# Patient Record
Sex: MALE | NOT HISPANIC OR LATINO | Employment: FULL TIME | URBAN - METROPOLITAN AREA
[De-identification: names, ages, dates, MRNs, and addresses within clinical notes are randomized per-mention and may not be internally consistent; named-entity substitution may affect disease eponyms.]

---

## 2018-02-23 ENCOUNTER — OFFICE VISIT CONVERTED (OUTPATIENT)
Dept: NEUROSURGERY | Facility: CLINIC | Age: 48
End: 2018-02-23
Attending: PHYSICIAN ASSISTANT

## 2021-05-16 VITALS
BODY MASS INDEX: 26.36 KG/M2 | SYSTOLIC BLOOD PRESSURE: 156 MMHG | HEART RATE: 103 BPM | WEIGHT: 178 LBS | DIASTOLIC BLOOD PRESSURE: 87 MMHG | HEIGHT: 69 IN

## 2022-12-20 ENCOUNTER — OFFICE VISIT (OUTPATIENT)
Dept: ORTHOPEDIC SURGERY | Facility: CLINIC | Age: 52
End: 2022-12-20

## 2022-12-20 VITALS — WEIGHT: 178 LBS | BODY MASS INDEX: 26.36 KG/M2 | HEIGHT: 69 IN

## 2022-12-20 DIAGNOSIS — M16.11 OSTEOARTHRITIS OF RIGHT HIP, UNSPECIFIED OSTEOARTHRITIS TYPE: ICD-10-CM

## 2022-12-20 DIAGNOSIS — M25.551 RIGHT HIP PAIN: Primary | ICD-10-CM

## 2022-12-20 PROCEDURE — 99203 OFFICE O/P NEW LOW 30 MIN: CPT | Performed by: ORTHOPAEDIC SURGERY

## 2022-12-20 RX ORDER — BUSPIRONE HYDROCHLORIDE 15 MG/1
15 TABLET ORAL 3 TIMES DAILY
COMMUNITY
Start: 2022-11-22

## 2022-12-20 RX ORDER — DULOXETIN HYDROCHLORIDE 60 MG/1
60 CAPSULE, DELAYED RELEASE ORAL DAILY
COMMUNITY
Start: 2022-11-22

## 2022-12-20 RX ORDER — MELOXICAM 15 MG/1
TABLET ORAL
COMMUNITY

## 2022-12-20 RX ORDER — GABAPENTIN 400 MG/1
CAPSULE ORAL
COMMUNITY

## 2022-12-20 RX ORDER — ATORVASTATIN CALCIUM 40 MG/1
TABLET, FILM COATED ORAL
COMMUNITY

## 2022-12-20 RX ORDER — TAMSULOSIN HYDROCHLORIDE 0.4 MG/1
CAPSULE ORAL
COMMUNITY

## 2022-12-20 RX ORDER — HYDROCODONE BITARTRATE AND ACETAMINOPHEN 7.5; 325 MG/1; MG/1
TABLET ORAL
COMMUNITY
Start: 2022-12-02

## 2022-12-20 NOTE — PROGRESS NOTES
"Chief Complaint  Initial Evaluation of the Right Hip     Subjective      Torri Mendoza presents to [facility] for initial evaluation of the right hip.  He has had pain for the last 8 years or so.  He had a right steroid injection of the hip a month ago.  He reports it just has gotten worse over time. He has difficulty with prolonged standing and ambulation.  He has not had much conservative treatment besides injections. He has had no new injury or fall.  He carries a lot of heavy items and up and down the ladder a lot at his job.     No Known Allergies     Social History     Socioeconomic History   • Marital status:    Tobacco Use   • Smoking status: Every Day     Types: Cigarettes        Review of Systems     Objective   Vital Signs:   Ht 175.3 cm (69\")   Wt 80.7 kg (178 lb)   BMI 26.29 kg/m²       Physical Exam  Constitutional:       Appearance: Normal appearance. Patient is well-developed and normal weight.   HENT:      Head: Normocephalic.      Right Ear: Hearing and external ear normal.      Left Ear: Hearing and external ear normal.      Nose: Nose normal.   Eyes:      Conjunctiva/sclera: Conjunctivae normal.   Cardiovascular:      Rate and Rhythm: Normal rate.   Pulmonary:      Effort: Pulmonary effort is normal.      Breath sounds: No wheezing or rales.   Abdominal:      Palpations: Abdomen is soft.      Tenderness: There is no abdominal tenderness.   Musculoskeletal:      Cervical back: Normal range of motion.   Skin:     Findings: No rash.   Neurological:      Mental Status: Patient is alert and oriented to person, place, and time.   Psychiatric:         Mood and Affect: Mood and affect normal.         Judgment: Judgment normal.       Ortho Exam      RIGHT HIP Dorsal pedal pulse 2+. Posterior tibialis pulse is 2+. Good strength to hamstrings, quadriceps, dorsiflexors, and plantar flexors. Sensation intact. Neurovascular Intact. No swelling. No skin discoloration or " muscle atrophy.       Procedures      Imaging Results (Most Recent)     None           Result Review :     X-Ray Report:  Right hip X-Ray  Indication: Evaluation of the right hip.   AP/Lateral view(s)  Findings: Severe arthritis.  Moderate - severe osseous abnormality, no dislocation or fracture..   Prior studies available for comparison:No            Assessment and Plan     Diagnoses and all orders for this visit:    1. Right hip pain (Primary)  -     XR Hip With or Without Pelvis 2 - 3 View Right; Future    2. Osteoarthritis of right hip, unspecified osteoarthritis type        Discussed the treatment plan with the patient. Discussed conservative measures as exercises, anti-inflammatory and injection. Discussed the treatment options with the patient, operative vs non-operative. He will need a total hip arthroplasty when he is ready. He is on Mobic if needed and pain medication. Discussed activity modification to prevent further injury and decrease pain.     Educated on risk of smoking. Discussed options for smoking cessation. and Call or return if worsening symptoms.    Follow Up     PRN    Patient was given instructions and counseling regarding his condition or for health maintenance advice. Please see specific information pulled into the AVS if appropriate.     Scribed for Dyan Ag MD by Clara Cleary MA.  12/20/22   15:48 EST    I have personally performed the services described in this document as scribed by the above individual and it is both accurate and complete. Dyan Ag MD 12/20/22

## 2024-12-13 ENCOUNTER — PREP FOR SURGERY (OUTPATIENT)
Dept: OTHER | Facility: HOSPITAL | Age: 54
End: 2024-12-13
Payer: COMMERCIAL

## 2024-12-13 ENCOUNTER — OFFICE VISIT (OUTPATIENT)
Dept: ORTHOPEDIC SURGERY | Facility: CLINIC | Age: 54
End: 2024-12-13
Payer: COMMERCIAL

## 2024-12-13 VITALS
BODY MASS INDEX: 26.29 KG/M2 | HEIGHT: 69 IN | OXYGEN SATURATION: 96 % | HEART RATE: 75 BPM | DIASTOLIC BLOOD PRESSURE: 90 MMHG | SYSTOLIC BLOOD PRESSURE: 163 MMHG

## 2024-12-13 DIAGNOSIS — M16.11 OSTEOARTHRITIS OF RIGHT HIP, UNSPECIFIED OSTEOARTHRITIS TYPE: Primary | ICD-10-CM

## 2024-12-13 DIAGNOSIS — M25.551 RIGHT HIP PAIN: Primary | ICD-10-CM

## 2024-12-13 DIAGNOSIS — M16.11 OSTEOARTHRITIS OF RIGHT HIP, UNSPECIFIED OSTEOARTHRITIS TYPE: ICD-10-CM

## 2024-12-13 PROBLEM — M16.9 OA (OSTEOARTHRITIS) OF HIP: Status: ACTIVE | Noted: 2024-12-13

## 2024-12-13 RX ORDER — TRANEXAMIC ACID 10 MG/ML
1000 INJECTION, SOLUTION INTRAVENOUS ONCE
OUTPATIENT
Start: 2024-12-13 | End: 2024-12-13

## 2024-12-13 RX ORDER — DICLOFENAC SODIUM 75 MG/1
75 TABLET, DELAYED RELEASE ORAL 2 TIMES DAILY
Qty: 60 TABLET | Refills: 1 | Status: SHIPPED | OUTPATIENT
Start: 2024-12-13

## 2024-12-13 NOTE — PROGRESS NOTES
"Chief Complaint  Follow-up of the Right Hip     Subjective      Torri Mendoza presents to [facility] for follow up of the right hip. He has had pain for the last 8 years or so. He had a right steroid injection of the hip a month ago. He reports it just has gotten worse over time. He has difficulty with prolonged standing and ambulation.  He has had no new injury or fall. He carries a lot of heavy items and up and down the ladder a lot at his job. He notes the pain is affecting his daily tasks and ADL's.  He can barely do his job at this time and has a significant painful and altered gait.     No Known Allergies     Social History     Socioeconomic History    Marital status:    Tobacco Use    Smoking status: Every Day     Types: Cigarettes   Substance and Sexual Activity    Alcohol use: Defer    Drug use: Defer    Sexual activity: Defer        I reviewed the patient's chief complaint, history of present illness, review of systems, past medical history, surgical history, family history, social history, medications, and allergy list.     Review of Systems     Constitutional: Denies fevers, chills, weight loss  Cardiovascular: Denies chest pain, shortness of breath  Skin: Denies rashes, acute skin changes  Neurologic: Denies headache, loss of consciousness        Vital Signs:   /90   Pulse 75   Ht 175.3 cm (69\")   SpO2 96%   BMI 26.29 kg/m²          Physical Exam  General: Alert. No acute distress    Ortho Exam        RIGHT HIP Limited ROM of the hip.  No skin discoloration, atrophy or swelling. Positive EHL, FHL, GS, and TA. Sensation intact to all 5 nerves of the foot. Positive straight leg raise. Leg lengths appear equal. Ambulates with Antalgic gait Negative Mary Ann. Negative Suresh. Good strength to hamstrings, quadriceps, dorsiflexors, and plantar flexors. Knee Extensor Mechanism  intact        Procedures      Imaging Results (Most Recent)       Procedure Component Value " Units Date/Time    XR Hip With or Without Pelvis 2 - 3 View Right [[id number]] Resulted: 12/13/24 0939     Updated: 12/13/24 0941             Result Review :     X-Ray Report:  Right hip X-Ray  Indication: Evaluation of the right hip   AP/Lateral view(s)  Findings: Advanced degenerative end stage bone on bone arthritis.   Prior studies available for comparison: yes        Assessment and Plan     Diagnoses and all orders for this visit:    1. Right hip pain (Primary)  -     XR Hip With or Without Pelvis 2 - 3 View Right    2. Osteoarthritis of right hip, unspecified osteoarthritis type        Discussed the treatment plan with the patient. I reviewed the X-rays that were obtained today with the patient.     Discussed the treatment options with the patient, operative vs non-operative. The patient expressed understanding and wished to proceed  with a right total hip arthroplasty.      Prescribed anti inflammatory.     Educated on risk of smoking/nicotine. Discussed options for smoking cessation., Discussed surgery., Risks/benefits discussed with patient including, but not limited to: infection, bleeding, neurovascular damage, re-rupture, aesthetic deformity, need for further surgery, and death., Discussed with patient the implant type being used during surgery and patient understands., Surgery pamphlet given., Call or return if worsening symptoms., and DME order for a 3 in 1 given today due to patient will be confined to one room/level of the home that does not offer a toilet during postop recovery.     Follow Up     2 weeks postoperatively       Patient was given instructions and counseling regarding his condition or for health maintenance advice. Please see specific information pulled into the AVS if appropriate.     Scribed for Dyan Ag MD by Clara Cleary MA.  12/13/24   09:51 EST    I have personally performed the services described in this document as scribed by the above individual and it is both  accurate and complete. Dyan Ag MD 12/13/24

## 2025-01-16 DIAGNOSIS — Z47.1 AFTERCARE FOLLOWING RIGHT HIP JOINT REPLACEMENT SURGERY: Primary | ICD-10-CM

## 2025-01-16 DIAGNOSIS — Z96.641 AFTERCARE FOLLOWING RIGHT HIP JOINT REPLACEMENT SURGERY: Primary | ICD-10-CM

## 2025-02-11 ENCOUNTER — PRE-ADMISSION TESTING (OUTPATIENT)
Dept: PREADMISSION TESTING | Facility: HOSPITAL | Age: 55
End: 2025-02-11
Payer: COMMERCIAL

## 2025-02-11 ENCOUNTER — ANESTHESIA EVENT (OUTPATIENT)
Dept: PERIOP | Facility: HOSPITAL | Age: 55
End: 2025-02-11
Payer: COMMERCIAL

## 2025-02-11 VITALS
BODY MASS INDEX: 25.52 KG/M2 | SYSTOLIC BLOOD PRESSURE: 146 MMHG | RESPIRATION RATE: 16 BRPM | DIASTOLIC BLOOD PRESSURE: 85 MMHG | WEIGHT: 172.29 LBS | TEMPERATURE: 98.4 F | OXYGEN SATURATION: 98 % | HEIGHT: 69 IN | HEART RATE: 67 BPM

## 2025-02-11 DIAGNOSIS — M16.11 OSTEOARTHRITIS OF RIGHT HIP, UNSPECIFIED OSTEOARTHRITIS TYPE: ICD-10-CM

## 2025-02-11 LAB
ALBUMIN SERPL-MCNC: 4.5 G/DL (ref 3.5–5.2)
ALBUMIN/GLOB SERPL: 1.5 G/DL
ALP SERPL-CCNC: 82 U/L (ref 39–117)
ALT SERPL W P-5'-P-CCNC: 12 U/L (ref 1–41)
ANION GAP SERPL CALCULATED.3IONS-SCNC: 11.5 MMOL/L (ref 5–15)
AST SERPL-CCNC: 18 U/L (ref 1–40)
BASOPHILS # BLD AUTO: 0.03 10*3/MM3 (ref 0–0.2)
BASOPHILS NFR BLD AUTO: 0.4 % (ref 0–1.5)
BILIRUB SERPL-MCNC: 0.5 MG/DL (ref 0–1.2)
BUN SERPL-MCNC: 11 MG/DL (ref 6–20)
BUN/CREAT SERPL: 13.3 (ref 7–25)
CALCIUM SPEC-SCNC: 9.1 MG/DL (ref 8.6–10.5)
CHLORIDE SERPL-SCNC: 104 MMOL/L (ref 98–107)
CO2 SERPL-SCNC: 23.5 MMOL/L (ref 22–29)
CREAT SERPL-MCNC: 0.83 MG/DL (ref 0.76–1.27)
DEPRECATED RDW RBC AUTO: 41.1 FL (ref 37–54)
EGFRCR SERPLBLD CKD-EPI 2021: 104 ML/MIN/1.73
EOSINOPHIL # BLD AUTO: 0.11 10*3/MM3 (ref 0–0.4)
EOSINOPHIL NFR BLD AUTO: 1.6 % (ref 0.3–6.2)
ERYTHROCYTE [DISTWIDTH] IN BLOOD BY AUTOMATED COUNT: 12.9 % (ref 12.3–15.4)
GLOBULIN UR ELPH-MCNC: 3.1 GM/DL
GLUCOSE SERPL-MCNC: 88 MG/DL (ref 65–99)
HBA1C MFR BLD: 5.1 % (ref 4.8–5.6)
HCT VFR BLD AUTO: 40.4 % (ref 37.5–51)
HGB BLD-MCNC: 14 G/DL (ref 13–17.7)
IMM GRANULOCYTES # BLD AUTO: 0.02 10*3/MM3 (ref 0–0.05)
IMM GRANULOCYTES NFR BLD AUTO: 0.3 % (ref 0–0.5)
LYMPHOCYTES # BLD AUTO: 2.04 10*3/MM3 (ref 0.7–3.1)
LYMPHOCYTES NFR BLD AUTO: 30 % (ref 19.6–45.3)
MCH RBC QN AUTO: 30.4 PG (ref 26.6–33)
MCHC RBC AUTO-ENTMCNC: 34.7 G/DL (ref 31.5–35.7)
MCV RBC AUTO: 87.6 FL (ref 79–97)
MONOCYTES # BLD AUTO: 0.73 10*3/MM3 (ref 0.1–0.9)
MONOCYTES NFR BLD AUTO: 10.7 % (ref 5–12)
NEUTROPHILS NFR BLD AUTO: 3.87 10*3/MM3 (ref 1.7–7)
NEUTROPHILS NFR BLD AUTO: 57 % (ref 42.7–76)
NRBC BLD AUTO-RTO: 0 /100 WBC (ref 0–0.2)
PLATELET # BLD AUTO: 269 10*3/MM3 (ref 140–450)
PMV BLD AUTO: 10.3 FL (ref 6–12)
POTASSIUM SERPL-SCNC: 4.2 MMOL/L (ref 3.5–5.2)
PROT SERPL-MCNC: 7.6 G/DL (ref 6–8.5)
QT INTERVAL: 381 MS
QTC INTERVAL: 394 MS
RBC # BLD AUTO: 4.61 10*6/MM3 (ref 4.14–5.8)
SODIUM SERPL-SCNC: 139 MMOL/L (ref 136–145)
WBC NRBC COR # BLD AUTO: 6.8 10*3/MM3 (ref 3.4–10.8)

## 2025-02-11 PROCEDURE — 80053 COMPREHEN METABOLIC PANEL: CPT

## 2025-02-11 PROCEDURE — 85025 COMPLETE CBC W/AUTO DIFF WBC: CPT

## 2025-02-11 PROCEDURE — 36415 COLL VENOUS BLD VENIPUNCTURE: CPT

## 2025-02-11 PROCEDURE — 93005 ELECTROCARDIOGRAM TRACING: CPT

## 2025-02-11 PROCEDURE — 83036 HEMOGLOBIN GLYCOSYLATED A1C: CPT

## 2025-02-11 RX ORDER — IBUPROFEN 200 MG
600 TABLET ORAL EVERY 8 HOURS PRN
COMMUNITY
End: 2025-02-17 | Stop reason: HOSPADM

## 2025-02-11 RX ORDER — DIPHENHYDRAMINE HCL 25 MG
25 CAPSULE ORAL EVERY 6 HOURS PRN
COMMUNITY

## 2025-02-11 NOTE — DISCHARGE INSTRUCTIONS
IMPORTANT INSTRUCTIONS - PRE-ADMISSION TESTING  DO NOT EAT OR CHEW anything after midnight the night before your procedure.    DRINK 20 OZ GATORADE OR POWERADE NO RED 3 HOURS PRIOR TO ARRIVAL.   Take the following medications the morning of your procedure with JUST A SIP OF WATER:  __  BENADRYL IF NEEDED_____________________________________________________________________________________________________________________________________________________________________________________    DO NOT BRING your medications to the hospital with you, UNLESS something has changed since your PRE-Admission Testing appointment.  STARTING NOW Hold all vitamins, supplements, and NSAIDS (Non- steroidal anti-inflammatory meds) for one week prior to surgery (you MAY take Tylenol or Acetaminophen).  If you are diabetic, check your blood sugar the morning of your procedure. If it is less than 70 or if you are feeling symptomatic, call the following number for further instructions: [phone]  Use your inhalers/nebulizers as usual, the morning of your procedure. BRING YOUR INHALERS with you.   Bring your CPAP or BIPAP to hospital, ONLY IF YOU WILL BE SPENDING THE NIGHT.   Make sure you have a ride home and have someone who will stay with you the day of your procedure after you go home.  If you have any questions, please call your Pre-Admission Testing Nurse, ___DANIEL_____________ at [phone]____________.   Per anesthesia request, do not smoke for 24 hours before your procedure or as instructed by your surgeon.    WILL CALL ON   2/14/25 NORMALLY BETWEEN 1 AND 4 PM TO GIVE OFFICIAL ARRIVAL TIME FOR DAY OF PROCEDURE  REFER TO PAGE 9 IN TOTAL JOINT BOOK FOR BATHING INSTRUCTIONS . NO JEWELRY OF ANY TYPE DAY OF PROCEDURE  COME TO [facility] [address] DAY OF PROCEDURE. GET ON ELEVATOR AND COME TO FIRST FLOOR TAKE LEFT OFF OF ELEVATOR.    CASH,  OR CARD FOR MEDS TO BED IF INDICATED AT DISCHARGE  NO VAPING OR SMOKING 24 HOURS  PRIOR TO PROCEDURE

## 2025-02-15 NOTE — H&P
Chief Complaint  No chief complaint on file.     Subjective      Torri Mendoza presents to [facility] for follow up of the right hip. He has had pain for the last 8 years or so. He had a right steroid injection of the hip a month ago. He reports it just has gotten worse over time. He has difficulty with prolonged standing and ambulation.  He has had no new injury or fall. He carries a lot of heavy items and up and down the ladder a lot at his job. He notes the pain is affecting his daily tasks and ADL's.  He can barely do his job at this time and has a significant painful and altered gait.     No Known Allergies     Social History     Socioeconomic History    Marital status:    Tobacco Use    Smoking status: Former     Types: Cigarettes    Tobacco comments:     REPORTS HAS SWITCHED FROM CIGARETTES TO VAPE AND REPORTS IS ATTEMPTING TO WEAN HIM SELF OFF COMPLETELY   Vaping Use    Vaping status: Every Day    Substances: Nicotine, Flavoring    Devices: Disposable   Substance and Sexual Activity    Alcohol use: Yes     Comment: OCC    Drug use: Never    Sexual activity: Defer        I reviewed the patient's chief complaint, history of present illness, review of systems, past medical history, surgical history, family history, social history, medications, and allergy list.     Review of Systems     Constitutional: Denies fevers, chills, weight loss  Cardiovascular: Denies chest pain, shortness of breath  Skin: Denies rashes, acute skin changes  Neurologic: Denies headache, loss of consciousness        Vital Signs:   There were no vitals taken for this visit.         Physical Exam  General: Alert. No acute distress    Ortho Exam        RIGHT HIP Limited ROM of the hip.  No skin discoloration, atrophy or swelling. Positive EHL, FHL, GS, and TA. Sensation intact to all 5 nerves of the foot. Positive straight leg raise. Leg lengths appear equal. Ambulates with Antalgic gait Negative Mary Ann. Negative  Suresh. Good strength to hamstrings, quadriceps, dorsiflexors, and plantar flexors. Knee Extensor Mechanism  intact        Procedures      Imaging Results (Most Recent)       None             Result Review :     X-Ray Report:  Right hip X-Ray  Indication: Evaluation of the right hip   AP/Lateral view(s)  Findings: Advanced degenerative end stage bone on bone arthritis.   Prior studies available for comparison: yes        Assessment and Plan     There are no diagnoses linked to this encounter.      Discussed the treatment plan with the patient. I reviewed the X-rays that were obtained today with the patient.     Discussed the treatment options with the patient, operative vs non-operative. The patient expressed understanding and wished to proceed  with a right total hip arthroplasty.      Prescribed anti inflammatory.     Educated on risk of smoking/nicotine. Discussed options for smoking cessation., Discussed surgery., Risks/benefits discussed with patient including, but not limited to: infection, bleeding, neurovascular damage, re-rupture, aesthetic deformity, need for further surgery, and death., Discussed with patient the implant type being used during surgery and patient understands., Surgery pamphlet given., Call or return if worsening symptoms., and DME order for a 3 in 1 given today due to patient will be confined to one room/level of the home that does not offer a toilet during postop recovery.     Follow Up     2 weeks postoperatively     I have personally performed the services described in this document as scribed by the above individual and it is both accurate and complete. Dyan Ag MD 02/15/25

## 2025-02-17 ENCOUNTER — ANESTHESIA (OUTPATIENT)
Dept: PERIOP | Facility: HOSPITAL | Age: 55
End: 2025-02-17
Payer: COMMERCIAL

## 2025-02-17 ENCOUNTER — APPOINTMENT (OUTPATIENT)
Dept: GENERAL RADIOLOGY | Facility: HOSPITAL | Age: 55
End: 2025-02-17
Payer: COMMERCIAL

## 2025-02-17 ENCOUNTER — HOSPITAL ENCOUNTER (OUTPATIENT)
Facility: HOSPITAL | Age: 55
Discharge: HOME | End: 2025-02-17
Attending: ORTHOPAEDIC SURGERY | Admitting: ORTHOPAEDIC SURGERY
Payer: COMMERCIAL

## 2025-02-17 VITALS
HEIGHT: 67 IN | OXYGEN SATURATION: 93 % | SYSTOLIC BLOOD PRESSURE: 149 MMHG | BODY MASS INDEX: 27.06 KG/M2 | WEIGHT: 172.4 LBS | DIASTOLIC BLOOD PRESSURE: 85 MMHG | RESPIRATION RATE: 18 BRPM | HEART RATE: 80 BPM | TEMPERATURE: 97.7 F

## 2025-02-17 DIAGNOSIS — M16.11 OSTEOARTHRITIS OF RIGHT HIP, UNSPECIFIED OSTEOARTHRITIS TYPE: ICD-10-CM

## 2025-02-17 DIAGNOSIS — R26.2 DIFFICULTY WALKING: Primary | ICD-10-CM

## 2025-02-17 DIAGNOSIS — Z78.9 DECREASED ACTIVITIES OF DAILY LIVING (ADL): ICD-10-CM

## 2025-02-17 PROCEDURE — 25010000002 LIDOCAINE PF 2% 2 % SOLUTION

## 2025-02-17 PROCEDURE — 94761 N-INVAS EAR/PLS OXIMETRY MLT: CPT

## 2025-02-17 PROCEDURE — 73502 X-RAY EXAM HIP UNI 2-3 VIEWS: CPT

## 2025-02-17 PROCEDURE — 25010000002 MIDAZOLAM PER 1MG: Performed by: ANESTHESIOLOGY

## 2025-02-17 PROCEDURE — 25010000002 ONDANSETRON PER 1 MG

## 2025-02-17 PROCEDURE — 25010000002 FENTANYL CITRATE (PF) 50 MCG/ML SOLUTION

## 2025-02-17 PROCEDURE — 97161 PT EVAL LOW COMPLEX 20 MIN: CPT

## 2025-02-17 PROCEDURE — 25010000002 EPINEPHRINE 1 MG/ML SOLUTION: Performed by: ORTHOPAEDIC SURGERY

## 2025-02-17 PROCEDURE — 25010000002 SUGAMMADEX 200 MG/2ML SOLUTION

## 2025-02-17 PROCEDURE — 25010000002 KETOROLAC TROMETHAMINE PER 15 MG: Performed by: ORTHOPAEDIC SURGERY

## 2025-02-17 PROCEDURE — 25010000002 HYDROMORPHONE 1 MG/ML SOLUTION

## 2025-02-17 PROCEDURE — 25010000002 PROPOFOL 10 MG/ML EMULSION

## 2025-02-17 PROCEDURE — 25810000003 LACTATED RINGERS PER 1000 ML: Performed by: ANESTHESIOLOGY

## 2025-02-17 PROCEDURE — 97535 SELF CARE MNGMENT TRAINING: CPT

## 2025-02-17 PROCEDURE — C1776 JOINT DEVICE (IMPLANTABLE): HCPCS | Performed by: ORTHOPAEDIC SURGERY

## 2025-02-17 PROCEDURE — 25010000002 MORPHINE PER 10 MG: Performed by: ORTHOPAEDIC SURGERY

## 2025-02-17 PROCEDURE — 97165 OT EVAL LOW COMPLEX 30 MIN: CPT

## 2025-02-17 PROCEDURE — 25010000002 ROPIVACAINE PER 1 MG: Performed by: ORTHOPAEDIC SURGERY

## 2025-02-17 PROCEDURE — 97116 GAIT TRAINING THERAPY: CPT

## 2025-02-17 PROCEDURE — 25010000002 CEFAZOLIN PER 500 MG: Performed by: ORTHOPAEDIC SURGERY

## 2025-02-17 PROCEDURE — 76000 FLUOROSCOPY <1 HR PHYS/QHP: CPT

## 2025-02-17 PROCEDURE — 25010000002 DEXAMETHASONE PER 1 MG

## 2025-02-17 PROCEDURE — 25810000003 LACTATED RINGERS PER 1000 ML: Performed by: ORTHOPAEDIC SURGERY

## 2025-02-17 PROCEDURE — 97150 GROUP THERAPEUTIC PROCEDURES: CPT

## 2025-02-17 DEVICE — STEM FEM/HIP TAPERLOC COMPL MICROPLASTY HI/OFFST SZ15: Type: IMPLANTABLE DEVICE | Site: HIP | Status: FUNCTIONAL

## 2025-02-17 DEVICE — LINER ACET G7 HI/WL E1 SZF 40MM: Type: IMPLANTABLE DEVICE | Site: HIP | Status: FUNCTIONAL

## 2025-02-17 DEVICE — SHLL ACET OSSEOTI G7 4HL SZF 54MM: Type: IMPLANTABLE DEVICE | Site: HIP | Status: FUNCTIONAL

## 2025-02-17 DEVICE — TOTAL HIP PRIMARY: Type: IMPLANTABLE DEVICE | Site: HIP | Status: FUNCTIONAL

## 2025-02-17 DEVICE — CP HIP UPCHRG OSSEOTI LTD HL CUPS: Type: IMPLANTABLE DEVICE | Site: HIP | Status: FUNCTIONAL

## 2025-02-17 DEVICE — SCRW ACET CORT TRILOGY S/TAP 6.5X25: Type: IMPLANTABLE DEVICE | Site: HIP | Status: FUNCTIONAL

## 2025-02-17 DEVICE — HD FEM/HIP G7 BIOLOX/DELTA OPTN 40MM: Type: IMPLANTABLE DEVICE | Site: HIP | Status: FUNCTIONAL

## 2025-02-17 DEVICE — ADAPT HIP BIOLOX OPTN TYPE1 TPR PLS3: Type: IMPLANTABLE DEVICE | Site: HIP | Status: FUNCTIONAL

## 2025-02-17 RX ORDER — SODIUM CHLORIDE 9 MG/ML
40 INJECTION, SOLUTION INTRAVENOUS AS NEEDED
Status: DISCONTINUED | OUTPATIENT
Start: 2025-02-17 | End: 2025-02-17 | Stop reason: HOSPADM

## 2025-02-17 RX ORDER — SODIUM CHLORIDE, SODIUM LACTATE, POTASSIUM CHLORIDE, CALCIUM CHLORIDE 600; 310; 30; 20 MG/100ML; MG/100ML; MG/100ML; MG/100ML
100 INJECTION, SOLUTION INTRAVENOUS CONTINUOUS PRN
Status: DISCONTINUED | OUTPATIENT
Start: 2025-02-17 | End: 2025-02-17 | Stop reason: HOSPADM

## 2025-02-17 RX ORDER — PHENYLEPHRINE HCL IN 0.9% NACL 1 MG/10 ML
SYRINGE (ML) INTRAVENOUS AS NEEDED
Status: DISCONTINUED | OUTPATIENT
Start: 2025-02-17 | End: 2025-02-17 | Stop reason: SURG

## 2025-02-17 RX ORDER — FERROUS SULFATE 325(65) MG
325 TABLET ORAL
Status: DISCONTINUED | OUTPATIENT
Start: 2025-02-17 | End: 2025-02-17 | Stop reason: HOSPADM

## 2025-02-17 RX ORDER — ONDANSETRON 2 MG/ML
INJECTION INTRAMUSCULAR; INTRAVENOUS AS NEEDED
Status: DISCONTINUED | OUTPATIENT
Start: 2025-02-17 | End: 2025-02-17 | Stop reason: SURG

## 2025-02-17 RX ORDER — PROPOFOL 10 MG/ML
VIAL (ML) INTRAVENOUS AS NEEDED
Status: DISCONTINUED | OUTPATIENT
Start: 2025-02-17 | End: 2025-02-17 | Stop reason: SURG

## 2025-02-17 RX ORDER — TRANEXAMIC ACID 10 MG/ML
1000 INJECTION, SOLUTION INTRAVENOUS ONCE
Status: COMPLETED | OUTPATIENT
Start: 2025-02-17 | End: 2025-02-17

## 2025-02-17 RX ORDER — ACETAMINOPHEN 500 MG
1000 TABLET ORAL EVERY 8 HOURS
Status: DISCONTINUED | OUTPATIENT
Start: 2025-02-17 | End: 2025-02-17 | Stop reason: HOSPADM

## 2025-02-17 RX ORDER — FAMOTIDINE 20 MG/1
40 TABLET, FILM COATED ORAL DAILY
Status: DISCONTINUED | OUTPATIENT
Start: 2025-02-17 | End: 2025-02-17 | Stop reason: HOSPADM

## 2025-02-17 RX ORDER — SODIUM CHLORIDE 0.9 % (FLUSH) 0.9 %
10 SYRINGE (ML) INJECTION AS NEEDED
Status: DISCONTINUED | OUTPATIENT
Start: 2025-02-17 | End: 2025-02-17 | Stop reason: HOSPADM

## 2025-02-17 RX ORDER — ENOXAPARIN SODIUM 100 MG/ML
40 INJECTION SUBCUTANEOUS DAILY
Status: DISCONTINUED | OUTPATIENT
Start: 2025-02-18 | End: 2025-02-17 | Stop reason: HOSPADM

## 2025-02-17 RX ORDER — HYDROCODONE BITARTRATE AND ACETAMINOPHEN 7.5; 325 MG/1; MG/1
2 TABLET ORAL EVERY 4 HOURS PRN
Status: DISCONTINUED | OUTPATIENT
Start: 2025-02-17 | End: 2025-02-17 | Stop reason: HOSPADM

## 2025-02-17 RX ORDER — NALOXONE HCL 0.4 MG/ML
0.4 VIAL (ML) INJECTION
Status: DISCONTINUED | OUTPATIENT
Start: 2025-02-17 | End: 2025-02-17 | Stop reason: HOSPADM

## 2025-02-17 RX ORDER — AMOXICILLIN 250 MG
2 CAPSULE ORAL 2 TIMES DAILY
Status: DISCONTINUED | OUTPATIENT
Start: 2025-02-17 | End: 2025-02-17 | Stop reason: HOSPADM

## 2025-02-17 RX ORDER — CELECOXIB 100 MG/1
200 CAPSULE ORAL ONCE
Status: COMPLETED | OUTPATIENT
Start: 2025-02-17 | End: 2025-02-17

## 2025-02-17 RX ORDER — KETOROLAC TROMETHAMINE 15 MG/ML
15 INJECTION, SOLUTION INTRAMUSCULAR; INTRAVENOUS EVERY 6 HOURS SCHEDULED
Status: DISCONTINUED | OUTPATIENT
Start: 2025-02-17 | End: 2025-02-17 | Stop reason: HOSPADM

## 2025-02-17 RX ORDER — PETROLATUM,WHITE
OINTMENT IN PACKET (GRAM) TOPICAL AS NEEDED
Status: DISCONTINUED | OUTPATIENT
Start: 2025-02-17 | End: 2025-02-17 | Stop reason: SURG

## 2025-02-17 RX ORDER — HYDROCODONE BITARTRATE AND ACETAMINOPHEN 7.5; 325 MG/1; MG/1
1 TABLET ORAL EVERY 4 HOURS PRN
Status: DISCONTINUED | OUTPATIENT
Start: 2025-02-17 | End: 2025-02-17 | Stop reason: HOSPADM

## 2025-02-17 RX ORDER — ACETAMINOPHEN 500 MG
1000 TABLET ORAL ONCE
Status: COMPLETED | OUTPATIENT
Start: 2025-02-17 | End: 2025-02-17

## 2025-02-17 RX ORDER — ACETAMINOPHEN 325 MG/1
325 TABLET ORAL EVERY 4 HOURS PRN
Status: DISCONTINUED | OUTPATIENT
Start: 2025-02-17 | End: 2025-02-17 | Stop reason: HOSPADM

## 2025-02-17 RX ORDER — FENTANYL CITRATE 50 UG/ML
INJECTION, SOLUTION INTRAMUSCULAR; INTRAVENOUS AS NEEDED
Status: DISCONTINUED | OUTPATIENT
Start: 2025-02-17 | End: 2025-02-17 | Stop reason: SURG

## 2025-02-17 RX ORDER — LIDOCAINE HYDROCHLORIDE 20 MG/ML
INJECTION, SOLUTION EPIDURAL; INFILTRATION; INTRACAUDAL; PERINEURAL AS NEEDED
Status: DISCONTINUED | OUTPATIENT
Start: 2025-02-17 | End: 2025-02-17 | Stop reason: SURG

## 2025-02-17 RX ORDER — PROMETHAZINE HYDROCHLORIDE 12.5 MG/1
12.5 SUPPOSITORY RECTAL EVERY 6 HOURS PRN
Status: DISCONTINUED | OUTPATIENT
Start: 2025-02-17 | End: 2025-02-17 | Stop reason: HOSPADM

## 2025-02-17 RX ORDER — OXYCODONE HYDROCHLORIDE 5 MG/1
5 TABLET ORAL
Status: COMPLETED | OUTPATIENT
Start: 2025-02-17 | End: 2025-02-17

## 2025-02-17 RX ORDER — PROMETHAZINE HYDROCHLORIDE 25 MG/1
25 TABLET ORAL ONCE AS NEEDED
Status: DISCONTINUED | OUTPATIENT
Start: 2025-02-17 | End: 2025-02-17 | Stop reason: HOSPADM

## 2025-02-17 RX ORDER — SODIUM CHLORIDE, SODIUM LACTATE, POTASSIUM CHLORIDE, CALCIUM CHLORIDE 600; 310; 30; 20 MG/100ML; MG/100ML; MG/100ML; MG/100ML
100 INJECTION, SOLUTION INTRAVENOUS CONTINUOUS
Status: ACTIVE | OUTPATIENT
Start: 2025-02-17 | End: 2025-02-17

## 2025-02-17 RX ORDER — DEXMEDETOMIDINE HYDROCHLORIDE 100 UG/ML
INJECTION, SOLUTION INTRAVENOUS AS NEEDED
Status: DISCONTINUED | OUTPATIENT
Start: 2025-02-17 | End: 2025-02-17 | Stop reason: SURG

## 2025-02-17 RX ORDER — PROMETHAZINE HYDROCHLORIDE 25 MG/1
25 SUPPOSITORY RECTAL ONCE AS NEEDED
Status: DISCONTINUED | OUTPATIENT
Start: 2025-02-17 | End: 2025-02-17 | Stop reason: HOSPADM

## 2025-02-17 RX ORDER — DEXAMETHASONE SODIUM PHOSPHATE 4 MG/ML
INJECTION, SOLUTION INTRA-ARTICULAR; INTRALESIONAL; INTRAMUSCULAR; INTRAVENOUS; SOFT TISSUE AS NEEDED
Status: DISCONTINUED | OUTPATIENT
Start: 2025-02-17 | End: 2025-02-17 | Stop reason: SURG

## 2025-02-17 RX ORDER — MIDAZOLAM HYDROCHLORIDE 2 MG/2ML
2 INJECTION, SOLUTION INTRAMUSCULAR; INTRAVENOUS ONCE
Status: COMPLETED | OUTPATIENT
Start: 2025-02-17 | End: 2025-02-17

## 2025-02-17 RX ORDER — HYDROCODONE BITARTRATE AND ACETAMINOPHEN 7.5; 325 MG/1; MG/1
1-2 TABLET ORAL EVERY 4 HOURS PRN
Qty: 40 TABLET | Refills: 0 | Status: SHIPPED | OUTPATIENT
Start: 2025-02-17 | End: 2025-02-21 | Stop reason: SDUPTHER

## 2025-02-17 RX ORDER — ACETAMINOPHEN 500 MG
1000 TABLET ORAL EVERY 6 HOURS PRN
COMMUNITY
End: 2025-02-17 | Stop reason: HOSPADM

## 2025-02-17 RX ORDER — PROMETHAZINE HYDROCHLORIDE 25 MG/1
12.5 TABLET ORAL EVERY 6 HOURS PRN
Status: DISCONTINUED | OUTPATIENT
Start: 2025-02-17 | End: 2025-02-17 | Stop reason: HOSPADM

## 2025-02-17 RX ORDER — ONDANSETRON 2 MG/ML
4 INJECTION INTRAMUSCULAR; INTRAVENOUS EVERY 6 HOURS PRN
Status: DISCONTINUED | OUTPATIENT
Start: 2025-02-17 | End: 2025-02-17 | Stop reason: HOSPADM

## 2025-02-17 RX ORDER — ONDANSETRON 4 MG/1
4 TABLET, ORALLY DISINTEGRATING ORAL EVERY 6 HOURS PRN
Status: DISCONTINUED | OUTPATIENT
Start: 2025-02-17 | End: 2025-02-17 | Stop reason: HOSPADM

## 2025-02-17 RX ORDER — SODIUM CHLORIDE 0.9 % (FLUSH) 0.9 %
10 SYRINGE (ML) INJECTION EVERY 12 HOURS SCHEDULED
Status: DISCONTINUED | OUTPATIENT
Start: 2025-02-17 | End: 2025-02-17 | Stop reason: HOSPADM

## 2025-02-17 RX ORDER — BISACODYL 10 MG
10 SUPPOSITORY, RECTAL RECTAL DAILY PRN
Status: DISCONTINUED | OUTPATIENT
Start: 2025-02-17 | End: 2025-02-17 | Stop reason: HOSPADM

## 2025-02-17 RX ORDER — ROCURONIUM BROMIDE 10 MG/ML
INJECTION, SOLUTION INTRAVENOUS AS NEEDED
Status: DISCONTINUED | OUTPATIENT
Start: 2025-02-17 | End: 2025-02-17 | Stop reason: SURG

## 2025-02-17 RX ORDER — MEPERIDINE HYDROCHLORIDE 25 MG/ML
12.5 INJECTION INTRAMUSCULAR; INTRAVENOUS; SUBCUTANEOUS
Status: DISCONTINUED | OUTPATIENT
Start: 2025-02-17 | End: 2025-02-17 | Stop reason: HOSPADM

## 2025-02-17 RX ORDER — ONDANSETRON 2 MG/ML
4 INJECTION INTRAMUSCULAR; INTRAVENOUS ONCE AS NEEDED
Status: DISCONTINUED | OUTPATIENT
Start: 2025-02-17 | End: 2025-02-17 | Stop reason: HOSPADM

## 2025-02-17 RX ORDER — MAGNESIUM HYDROXIDE 1200 MG/15ML
LIQUID ORAL AS NEEDED
Status: DISCONTINUED | OUTPATIENT
Start: 2025-02-17 | End: 2025-02-17 | Stop reason: HOSPADM

## 2025-02-17 RX ADMIN — MIDAZOLAM HYDROCHLORIDE 2 MG: 1 INJECTION, SOLUTION INTRAMUSCULAR; INTRAVENOUS at 06:43

## 2025-02-17 RX ADMIN — TRANEXAMIC ACID 1000 MG: 10 INJECTION, SOLUTION INTRAVENOUS at 06:43

## 2025-02-17 RX ADMIN — SODIUM CHLORIDE 2000 MG: 9 INJECTION, SOLUTION INTRAVENOUS at 15:22

## 2025-02-17 RX ADMIN — SODIUM CHLORIDE, SODIUM LACTATE, POTASSIUM CHLORIDE, CALCIUM CHLORIDE 100 ML/HR: 20; 30; 600; 310 INJECTION, SOLUTION INTRAVENOUS at 10:40

## 2025-02-17 RX ADMIN — CELECOXIB 200 MG: 100 CAPSULE ORAL at 06:42

## 2025-02-17 RX ADMIN — DEXMEDETOMIDINE 20 MCG: 100 INJECTION, SOLUTION, CONCENTRATE INTRAVENOUS at 07:49

## 2025-02-17 RX ADMIN — SODIUM CHLORIDE, SODIUM LACTATE, POTASSIUM CHLORIDE, CALCIUM CHLORIDE 100 ML/HR: 20; 30; 600; 310 INJECTION, SOLUTION INTRAVENOUS at 06:42

## 2025-02-17 RX ADMIN — ACETAMINOPHEN 1000 MG: 500 TABLET ORAL at 06:42

## 2025-02-17 RX ADMIN — KETOROLAC TROMETHAMINE 15 MG: 15 INJECTION, SOLUTION INTRAMUSCULAR; INTRAVENOUS at 12:37

## 2025-02-17 RX ADMIN — ONDANSETRON 4 MG: 2 INJECTION INTRAMUSCULAR; INTRAVENOUS at 08:38

## 2025-02-17 RX ADMIN — OXYCODONE HYDROCHLORIDE 5 MG: 5 TABLET ORAL at 09:24

## 2025-02-17 RX ADMIN — PROPOFOL 30 MG: 10 INJECTION, EMULSION INTRAVENOUS at 08:53

## 2025-02-17 RX ADMIN — HYDROMORPHONE HYDROCHLORIDE 0.5 MG: 1 INJECTION, SOLUTION INTRAMUSCULAR; INTRAVENOUS; SUBCUTANEOUS at 09:36

## 2025-02-17 RX ADMIN — ACETAMINOPHEN 1000 MG: 500 TABLET ORAL at 15:22

## 2025-02-17 RX ADMIN — DEXAMETHASONE SODIUM PHOSPHATE 4 MG: 4 INJECTION, SOLUTION INTRAMUSCULAR; INTRAVENOUS at 07:30

## 2025-02-17 RX ADMIN — TRANEXAMIC ACID 1000 MG: 10 INJECTION, SOLUTION INTRAVENOUS at 08:38

## 2025-02-17 RX ADMIN — Medication 200 MCG: at 08:44

## 2025-02-17 RX ADMIN — FENTANYL CITRATE 50 MCG: 50 INJECTION, SOLUTION INTRAMUSCULAR; INTRAVENOUS at 07:15

## 2025-02-17 RX ADMIN — SENNOSIDES AND DOCUSATE SODIUM 2 TABLET: 50; 8.6 TABLET ORAL at 10:41

## 2025-02-17 RX ADMIN — OXYCODONE HYDROCHLORIDE 5 MG: 5 TABLET ORAL at 09:46

## 2025-02-17 RX ADMIN — ROCURONIUM BROMIDE 80 MG: 50 INJECTION INTRAVENOUS at 07:15

## 2025-02-17 RX ADMIN — FERROUS SULFATE TAB 325 MG (65 MG ELEMENTAL FE) 325 MG: 325 (65 FE) TAB at 10:41

## 2025-02-17 RX ADMIN — PROPOFOL 200 MG: 10 INJECTION, EMULSION INTRAVENOUS at 07:15

## 2025-02-17 RX ADMIN — LIDOCAINE HYDROCHLORIDE 50 MG: 20 INJECTION, SOLUTION INTRAVENOUS at 07:15

## 2025-02-17 RX ADMIN — Medication 10 ML: at 10:42

## 2025-02-17 RX ADMIN — SUGAMMADEX 200 MG: 100 INJECTION, SOLUTION INTRAVENOUS at 08:53

## 2025-02-17 RX ADMIN — FAMOTIDINE 40 MG: 20 TABLET, FILM COATED ORAL at 10:41

## 2025-02-17 RX ADMIN — FENTANYL CITRATE 50 MCG: 50 INJECTION, SOLUTION INTRAMUSCULAR; INTRAVENOUS at 07:49

## 2025-02-17 RX ADMIN — SODIUM CHLORIDE 2 G: 9 INJECTION, SOLUTION INTRAVENOUS at 07:20

## 2025-02-17 RX ADMIN — Medication 1 PACKAGE: at 07:19

## 2025-02-17 RX ADMIN — ROCURONIUM BROMIDE 5 MG: 50 INJECTION INTRAVENOUS at 08:28

## 2025-02-17 RX ADMIN — HYDROMORPHONE HYDROCHLORIDE 0.5 MG: 1 INJECTION, SOLUTION INTRAMUSCULAR; INTRAVENOUS; SUBCUTANEOUS at 09:56

## 2025-02-17 RX ADMIN — DEXMEDETOMIDINE 20 MCG: 100 INJECTION, SOLUTION, CONCENTRATE INTRAVENOUS at 07:44

## 2025-02-17 NOTE — SIGNIFICANT NOTE
02/17/25 1348   Plan   Final Discharge Disposition Code 01 - home or self-care   Final Note PTA Antonia. Appt: 02/19/25 at 10:30AM.

## 2025-02-17 NOTE — ANESTHESIA POSTPROCEDURE EVALUATION
Patient: Torri Mendoza    Procedure Summary       Date: 02/17/25 Room / Location: [facility] OR 02 / [facility] MAIN OR    Anesthesia Start: 0712 Anesthesia Stop: 0906    Procedure: RIGHT TOTAL HIP ARTHROPLASTY ANTERIOR (Right: Hip) Diagnosis:       Osteoarthritis of right hip, unspecified osteoarthritis type      (Osteoarthritis of right hip, unspecified osteoarthritis type [M16.11])    Surgeons: Dyan Ag MD Provider: Librado Deal MD    Anesthesia Type: ERAS Protocol ASA Status: 2            Anesthesia Type: ERAS Protocol    Vitals  Vitals Value Taken Time   /88 02/17/25 0934   Temp 36.2 °C (97.1 °F) 02/17/25 0904   Pulse 69 02/17/25 0938   Resp 14 02/17/25 0930   SpO2 98 % 02/17/25 0938   Vitals shown include unfiled device data.        Post Anesthesia Care and Evaluation    Patient location during evaluation: bedside  Patient participation: complete - patient participated  Level of consciousness: awake and alert  Pain management: adequate    Airway patency: patent  Anesthetic complications: No anesthetic complications  PONV Status: none  Cardiovascular status: acceptable  Respiratory status: acceptable  Hydration status: acceptable    Comments: An Anesthesiologist personally participated in the most demanding procedures (including induction and emergence if applicable) in the anesthesia plan, monitored the course of anesthesia administration at frequent intervals and remained physically present and available for immediate diagnosis and treatment of emergencies.

## 2025-02-17 NOTE — THERAPY EVALUATION
Acute Care - Physical Therapy Initial Evaluation  MARCUS Méndez     Patient Name: Torri Mendoza  : 1970  MRN: [id number]  Today's Date: 2025      Visit Dx: Admit date: 2025     Referring Physician: Dyan Ag MD     Surgery Date:2025   Procedure(s) (LRB):  RIGHT TOTAL HIP ARTHROPLASTY ANTERIOR (Right)       ICD-10-CM ICD-9-CM   1. Difficulty walking  R26.2 719.7   2. Osteoarthritis of right hip, unspecified osteoarthritis type  M16.11 715.95     Patient Active Problem List   Diagnosis    Osteoarthritis of right hip    OA (osteoarthritis) of hip     Past Medical History:   Diagnosis Date    Hyperlipidemia     Lumbar radiculopathy     Lumbar spondylosis     Osteoarthritis     right hip     Past Surgical History:   Procedure Laterality Date    APPENDECTOMY      COLONOSCOPY      HERNIA REPAIR       PT Assessment (Last 12 Hours)       PT Evaluation and Treatment       Row Name 25 1100          Physical Therapy Time and Intention    Subjective Information no complaints  -DP     Document Type evaluation  -DP     Mode of Treatment individual therapy;physical therapy  -DP     Patient Effort good  -DP       Row Name 25 1100          General Information    Patient Profile Reviewed yes  -DP     Patient Observations alert;cooperative;agree to therapy  -DP     Prior Level of Function independent:;gait;transfer;bed mobility;ADL's  -DP     Equipment Currently Used at Home none  -DP     Existing Precautions/Restrictions fall  -DP     Barriers to Rehab none identified  -DP       Row Name 25 1100          Living Environment    Current Living Arrangements home  -DP     Home Accessibility stairs to enter home  -DP     People in Home spouse  -DP       Row Name 25 1100          Home Main Entrance    Number of Stairs, Main Entrance two  -DP       Row Name 25 1100          Cognition    Orientation Status (Cognition) oriented x 3  -DP       Row Name 25 1100          Range of Motion  Comprehensive    Comment, General Range of Motion BLE ROM:WFL  -DP       Row Name 02/17/25 1100          Strength Comprehensive (MMT)    Comment, General Manual Muscle Testing (MMT) Assessment BLE: WFL except  -DP       Row Name 02/17/25 1100          Mobility    Extremity Weight-bearing Status right lower extremity  -DP     Right Lower Extremity (Weight-bearing Status) weight-bearing as tolerated (WBAT)  -DP       Row Name 02/17/25 1100          Bed Mobility    Bed Mobility supine-sit  -DP     Supine-Sit Miner (Bed Mobility) minimum assist (75% patient effort)  -DP       Row Name 02/17/25 1100          Transfers    Transfers sit-stand transfer  -DP       Row Name 02/17/25 1100          Sit-Stand Transfer    Sit-Stand Miner (Transfers) contact guard  -DP       Row Name 02/17/25 1100          Gait/Stairs (Locomotion)    Gait/Stairs Locomotion gait/ambulation assistive device  -DP     [place] Level (Gait) contact guard  -DP     Assistive Device (Gait) walker, front-wheeled  -DP     Patient was able to Ambulate yes  -DP     Distance in Feet (Gait) 20  -DP       Row Name 02/17/25 1100          Balance    Balance Assessment standing dynamic balance  -DP     Dynamic Standing Balance contact guard  -DP     Position/Device Used, Standing Balance supported;walker, front-wheeled  -DP       Row Name             Wound 02/17/25 0742 Right anterior hip    Wound - Properties Group Placement Date: 02/17/25  -SC Placement Time: 0742  -SC Side: Right  -SC Orientation: anterior  -SC Location: hip  -SC Primary Wound Type: Incision  -SC    Retired Wound - Properties Group Placement Date: 02/17/25  -SC Placement Time: 0742  -SC Side: Right  -SC Orientation: anterior  -SC Location: hip  -SC Primary Wound Type: Incision  -SC    Retired Wound - Properties Group Placement Date: 02/17/25  -SC Placement Time: 0742  -SC Side: Right  -SC Orientation: anterior  -SC Location: hip  -SC Primary Wound Type: Incision  -SC    Retired  Wound - Properties Group Date first assessed: 02/17/25  -SC Time first assessed: 0742  -SC Side: Right  -SC Location: hip  -SC Primary Wound Type: Incision  -SC      Row Name 02/17/25 1100          Plan of Care Review    Plan of Care Reviewed With patient  -DP     Outcome Evaluation Pt had a joint replacement surgery today, and presents with decreased strength, ROM and ambulation. Will benefit from inpatient PT services and outpatient PT services upon DC. Recommend RW and BSC to use upon DC.  -DP       Row Name 02/17/25 1100          Positioning and Restraints    Post Treatment Position chair  -DP     In Chair exit alarm on;encouraged to call for assist;call light within reach  -DP       Row Name 02/17/25 1100          Therapy Assessment/Plan (PT)    Criteria for Skilled Interventions Met (PT) yes;meets criteria  -DP     Therapy Frequency (PT) 2 times/day  -DP     Predicted Duration of Therapy Intervention (PT) 10 days  -DP     Problem List (PT) problems related to;balance;mobility;range of motion (ROM)  -DP     Activity Limitations Related to Problem List (PT) unable to transfer safely;unable to ambulate safely  -DP       Row Name 02/17/25 1100          PT Evaluation Complexity    History, PT Evaluation Complexity no personal factors and/or comorbidities  -DP     Examination of Body Systems (PT Eval Complexity) total of 4 or more elements  -DP     Clinical Presentation (PT Evaluation Complexity) stable  -DP     Clinical Decision Making (PT Evaluation Complexity) low complexity  -DP     Overall Complexity (PT Evaluation Complexity) low complexity  -DP       Row Name 02/17/25 1100          Physical Therapy Goals    Transfer Goal Selection (PT) transfer, PT goal 1  -DP     Gait Training Goal Selection (PT) gait training, PT goal 1  -DP       Row Name 02/17/25 1100          Transfer Goal 1 (PT)    Activity/Assistive Device (Transfer Goal 1, PT) sit-to-stand/stand-to-sit  -DP     [place] Level/Cues Needed (Transfer  Goal 1, PT) supervision required  -DP     Time Frame (Transfer Goal 1, PT) 10 days  -DP       Row Name 02/17/25 1100          Gait Training Goal 1 (PT)    Activity/Assistive Device (Gait Training Goal 1, PT) assistive device use;walker, rolling  -DP     [place] Level (Gait Training Goal 1, PT) supervision required  -DP     Distance (Gait Training Goal 1, PT) 300  -DP     Time Frame (Gait Training Goal 1, PT) 10 days  -DP               User Key  (r) = Recorded By, (t) = Taken By, (c) = Cosigned By      Initials Name Provider Type    Melita Vines, RN Registered Nurse    Grecia Ruiz, PT Physical Therapist                    Physical Therapy Education        No education to display                  PT Recommendation and Plan  Anticipated Discharge Disposition (PT): home with outpatient therapy services  Planned Therapy Interventions (PT): balance training, bed mobility training, gait training, home exercise program, ROM (range of motion), strengthening  Therapy Frequency (PT): 2 times/day  Plan of Care Reviewed With: patient  Outcome Evaluation: Pt had a joint replacement surgery today, and presents with decreased strength, ROM and ambulation. Will benefit from inpatient PT services and outpatient PT services upon DC. Recommend RW and BSC to use upon DC.   Outcome Measures       Row Name 02/17/25 1100             How much help from another person do you currently need...    Turning from your back to your side while in flat bed without using bedrails? 4  -DP      Moving from lying on back to sitting on the side of a flat bed without bedrails? 3  -DP      Moving to and from a bed to a chair (including a wheelchair)? 3  -DP      Standing up from a chair using your arms (e.g., wheelchair, bedside chair)? 3  -DP      Climbing 3-5 steps with a railing? 3  -DP      To walk in hospital room? 3  -DP      AM-PAC 6 Clicks Score (PT) 19  -DP         Functional Assessment    Outcome Measure Options AM-PAC 6  Clicks Basic Mobility (PT)  -DP                User Key  (r) = Recorded By, (t) = Taken By, (c) = Cosigned By      Initials Name Provider Type    Grecia Ruiz, PT Physical Therapist                     Time Calculation:    PT Charges       Row Name 02/17/25 1103             Time Calculation    PT Received On 02/17/25  -DP      PT Goal Re-Cert Due Date 02/26/25  -DP         Untimed Charges    PT Eval/Re-eval Minutes 25  -DP         Total Minutes    Untimed Charges Total Minutes 25  -DP       Total Minutes 25  -DP                User Key  (r) = Recorded By, (t) = Taken By, (c) = Cosigned By      Initials Name Provider Type    Grecia Ruiz, PT Physical Therapist                      PT G-Codes  Outcome Measure Options: AM-PAC 6 Clicks Basic Mobility (PT)  AM-PAC 6 Clicks Score (PT): 19    Grecia Bernabe, PT  2/17/2025

## 2025-02-17 NOTE — SIGNIFICANT NOTE
02/17/25 1343   Plan   Final Discharge Disposition Code 01 - home or self-care   Final Note Patient needs a walker and 3in1 commode.

## 2025-02-17 NOTE — PLAN OF CARE
Goal Outcome Evaluation:  Plan of Care Reviewed With: patient, spouse        Progress: no change  Outcome Evaluation: Patient presents with limitations in self-care, functional transfers, and balance. He would benefit from continued skilled occupational therapy services to maximize independence with ADLs/functional transfers.    Anticipated Discharge Disposition (OT): home with outpatient therapy services, home with assist

## 2025-02-17 NOTE — PLAN OF CARE
Goal Outcome Evaluation:  Plan of Care Reviewed With: patient           Outcome Evaluation: Pt had a joint replacement surgery today, and presents with decreased strength, ROM and ambulation. Will benefit from inpatient PT services and outpatient PT services upon DC. Recommend RW and BSC to use upon DC.    Anticipated Discharge Disposition (PT): home with outpatient therapy services

## 2025-02-17 NOTE — PLAN OF CARE
Goal Outcome Evaluation:              Outcome Evaluation: Patient alert and oriented x 4, calm, cooperative, and pleasant.  VS WNL.  LCTA.  No complaints of pain this shift.  Block still effective.  Dsg to R hip clean, dry, and intact.  Patient participated in therapy services this shift.  Spouse at chairside and supportive of patient.  Discharge instructions provided to patient and reviewed with patient.  Patient states understanding.  Patient discharged home with all personal belongings to spouse in POV.

## 2025-02-17 NOTE — THERAPY EVALUATION
Patient Name: Torri Mendoza  : 1970    MRN: [id number]                              Today's Date: 2025       Admit Date: 2025    Visit Dx:     ICD-10-CM ICD-9-CM   1. Difficulty walking  R26.2 719.7   2. Osteoarthritis of right hip, unspecified osteoarthritis type  M16.11 715.95   3. Decreased activities of daily living (ADL)  Z78.9 V49.89     Patient Active Problem List   Diagnosis    Osteoarthritis of right hip    OA (osteoarthritis) of hip     Past Medical History:   Diagnosis Date    Hyperlipidemia     Lumbar radiculopathy     Lumbar spondylosis     Osteoarthritis     right hip     Past Surgical History:   Procedure Laterality Date    APPENDECTOMY      COLONOSCOPY      HERNIA REPAIR        General Information       Row Name 25 1349 25 1344       OT Time and Intention    Document Type therapy note (daily note)  - evaluation  -    Mode of Treatment individual therapy;occupational therapy  - individual therapy;occupational therapy  -      Row Name 25 1344          General Information    Patient Profile Reviewed yes  -     Prior Level of Function --  (I) with ADLs, ambulated w/o a device, has a step over tub where he stands to shower, standard commode, stands to groom, drives, and no home O2.  -     Existing Precautions/Restrictions fall;weight bearing  WBAT RLE  -     Barriers to Rehab none identified  -       Row Name 25 1344          Occupational Profile    Reason for Services/Referral (Occupational Profile) Patient is a 54 year old male who is currently status post right total hip replacement with anterior approach on 2025. Occupational therapy consulted due to recent decline in ADLs/functional transfers. No previous occupational therapy services for current condition.  -       Row Name 25 1344          Living Environment    People in Home spouse  Simultaneous filing. User may be unaware of other data.  -       Row Name 25  1344          Home Main Entrance    Number of Stairs, Main Entrance three  -LF       Row Name 02/17/25 1344          Cognition    Orientation Status (Cognition) oriented x 3  -       Row Name 02/17/25 1344          Safety Issues/Impairments Affecting Functional Mobility    Impairments Affecting Function (Mobility) balance;pain  -               User Key  (r) = Recorded By, (t) = Taken By, (c) = Cosigned By      Initials Name Provider Type     Melvina Chatterjee OT Occupational Therapist                     Mobility/ADL's       Row Name 02/17/25 1349 02/17/25 1346       Bed Mobility    Comment, (Bed Mobility) Patient upright and seated in recliner upon therapist arrival.  -LF Patient upright and seated in recliner upon therapist arrival.  -      Row Name 02/17/25 1349 02/17/25 1346       Transfers    Transfers sit-stand transfer;stand-sit transfer  -LF sit-stand transfer;stand-sit transfer  -LF      Row Name 02/17/25 1349 02/17/25 1346       Sit-Stand Transfer    Sit-Stand [place] (Transfers) contact guard  -LF contact guard  -LF    Assistive Device (Sit-Stand Transfers) walker, front-wheeled  -LF walker, front-wheeled  -LF      Row Name 02/17/25 1349 02/17/25 1346       Stand-Sit Transfer    Stand-Sit [place] (Transfers) contact guard  -LF contact guard  -LF    Assistive Device (Stand-Sit Transfers) walker, front-wheeled  -LF walker, front-wheeled  -LF      Row Name 02/17/25 1349 02/17/25 1346       Activities of Daily Living    BADL Assessment/Intervention upper body dressing;lower body dressing  -LF bathing;upper body dressing;lower body dressing;grooming;feeding;toileting  -      Row Name 02/17/25 1349 02/17/25 1346       Mobility    Extremity Weight-bearing Status right lower extremity  -LF right lower extremity  -LF    Right Lower Extremity (Weight-bearing Status) weight-bearing as tolerated (WBAT)  -LF weight-bearing as tolerated (WBAT)  -      Row Name 02/17/25 1346          Self-Feeding  Assessment/Training    [place] Level (Feeding) feeding skills;set up  -       Row Name 02/17/25 1349 02/17/25 1346       Lower Body Dressing Assessment/Training    [place] Level (Lower Body Dressing) lower body dressing skills;don;pants/bottoms;minimum assist (75% patient effort)  -LF lower body dressing skills;minimum assist (75% patient effort)  -    Position (Lower Body Dressing) unsupported sitting;supported standing  -LF --    Comment, (Lower Body Dressing) Educated patient on lower body adaptive dressing technique, verified learning via teachback.  -LF --      Row Name 02/17/25 1349 02/17/25 1346       Upper Body Dressing Assessment/Training    [place] Level (Upper Body Dressing) upper body dressing skills;don;pull-over garment;set up  - upper body dressing skills;set up  -LF    Position (Upper Body Dressing) unsupported sitting  -LF --      Row Name 02/17/25 1346          Bathing Assessment/Intervention    [place] Level (Bathing) bathing skills;upper body;set up;lower body;minimum assist (75% patient effort)  -       Row Name 02/17/25 1346          Grooming Assessment/Training    [place] Level (Grooming) grooming skills;set up  -       Row Name 02/17/25 1346          Toileting Assessment/Training    [place] Level (Toileting) toileting skills;minimum assist (75% patient effort)  -               User Key  (r) = Recorded By, (t) = Taken By, (c) = Cosigned By      Initials Name Provider Type     Melvina Chatterjee OT Occupational Therapist                   Obj/Interventions       Row Name 02/17/25 1346          Sensory Assessment (Somatosensory)    Sensory Assessment (Somatosensory) UE sensation intact  -       Row Name 02/17/25 1346          Vision Assessment/Intervention    Visual Impairment/Limitations WFL  -       Row Name 02/17/25 1346          Range of Motion Comprehensive    General Range of Motion bilateral upper extremity ROM WFL  -       Row Name  02/17/25 1346          Strength Comprehensive (MMT)    Comment, General Manual Muscle Testing (MMT) Assessment 5/5 BUEs  -LF       Row Name 02/17/25 1346          Motor Skills    Motor Skills coordination;functional endurance  -LF     Coordination bilateral;upper extremity;WFL  -LF     Functional Endurance Good for BADLs  -LF       Row Name 02/17/25 1349 02/17/25 1346       Balance    Balance Assessment sitting dynamic balance;standing dynamic balance  -LF sitting dynamic balance;standing dynamic balance  -LF    Dynamic Sitting Balance supervision  -LF supervision  -LF    Position, Sitting Balance unsupported;sitting in chair  -LF unsupported;sitting in chair  -LF    Dynamic Standing Balance contact guard  -LF contact guard  -LF    Position/Device Used, Standing Balance supported;walker, front-wheeled  -LF supported;walker, front-wheeled  -LF    Balance Interventions sitting;standing;sit to stand;supported;dynamic;occupation based/functional task;weight shifting activity  -LF --              User Key  (r) = Recorded By, (t) = Taken By, (c) = Cosigned By      Initials Name Provider Type     Melvina Chatterjee OT Occupational Therapist                   Goals/Plan       [facility] Name 02/17/25 1347          Bed Mobility Goal 1 (OT)    Activity/Assistive Device (Bed Mobility Goal 1, OT) bed mobility activities, all  -LF     [place] Level/Cues Needed (Bed Mobility Goal 1, OT) modified independence  -LF     Time Frame (Bed Mobility Goal 1, OT) long term goal (LTG);10 days  -[facility] Name 02/17/25 1347          Transfer Goal 1 (OT)    Activity/Assistive Device (Transfer Goal 1, OT) transfers, all  -LF     [place] Level/Cues Needed (Transfer Goal 1, OT) modified independence  -LF     Time Frame (Transfer Goal 1, OT) long term goal (LTG);10 days  -[facility] Name 02/17/25 1347          Bathing Goal 1 (OT)    Activity/Device (Bathing Goal 1, OT) bathing skills, all  -LF     [place] Level/Cues Needed (Bathing  Goal 1, OT) modified independence  -LF     Time Frame (Bathing Goal 1, OT) long term goal (LTG);10 days  -LF       Row Name 02/17/25 1347          Dressing Goal 1 (OT)    Activity/Device (Dressing Goal 1, OT) dressing skills, all  -LF     [place]/Cues Needed (Dressing Goal 1, OT) modified independence  -LF     Time Frame (Dressing Goal 1, OT) long term goal (LTG);10 days  -LF       Row Name 02/17/25 1347          Toileting Goal 1 (OT)    Activity/Device (Toileting Goal 1, OT) toileting skills, all  -LF     [place] Level/Cues Needed (Toileting Goal 1, OT) modified independence  -LF     Time Frame (Toileting Goal 1, OT) long term goal (LTG);10 days  -LF       Row Name 02/17/25 5690          Therapy Assessment/Plan (OT)    Planned Therapy Interventions (OT) activity tolerance training;BADL retraining;functional balance retraining;occupation/activity based interventions;patient/caregiver education/training;transfer/mobility retraining  -               User Key  (r) = Recorded By, (t) = Taken By, (c) = Cosigned By      Initials Name Provider Type     Melvina Chatterjee, OT Occupational Therapist                   Clinical Impression       Row Name 02/17/25 1348          Pain Assessment    Additional Documentation Pain Scale: FACES Pre/Post-Treatment (Group)  -       Row Name 02/17/25 7270          Pain Scale: FACES Pre/Post-Treatment    Pain: FACES Scale, Pretreatment 2-->hurts little bit  -     Posttreatment Pain Rating 2-->hurts little bit  -       Row Name 02/17/25 9333          Plan of Care Review    Plan of Care Reviewed With patient;spouse  -LF     Progress no change  -     Outcome Evaluation Patient presents with limitations in self-care, functional transfers, and balance. He would benefit from continued skilled occupational therapy services to maximize independence with ADLs/functional transfers.  -       Row Name 02/17/25 0161          Therapy Assessment/Plan (OT)    Patient/Family Therapy  Goal Statement (OT) To feel better and get back to work.  -LF     Rehab Potential (OT) good  -LF     Criteria for Skilled Therapeutic Interventions Met (OT) yes;meets criteria;skilled treatment is necessary  -     Therapy Frequency (OT) 5 times/wk  -LF       Row Name 02/17/25 1346          Therapy Plan Review/Discharge Plan (OT)    Equipment Needs Upon Discharge (OT) walker, rolling;commode chair  -LF     Anticipated Discharge Disposition (OT) home with outpatient therapy services;home with assist  -       Row Name 02/17/25 1346          Vital Signs    O2 Delivery Pre Treatment room air  -LF     O2 Delivery Intra Treatment room air  -LF     O2 Delivery Post Treatment room air  -LF       Row Name 02/17/25 1346          Positioning and Restraints    Pre-Treatment Position sitting in chair/recliner  -LF     Post Treatment Position chair  -LF     In Chair reclined;call light within reach;encouraged to call for assist;exit alarm on;with family/caregiver  -               User Key  (r) = Recorded By, (t) = Taken By, (c) = Cosigned By      Initials Name Provider Type     Melvina Chatterjee, OT Occupational Therapist                   Outcome Measures       Row Name 02/17/25 1347          How much help from another is currently needed...    Putting on and taking off regular lower body clothing? 3  -LF     Bathing (including washing, rinsing, and drying) 3  -LF     Toileting (which includes using toilet bed pan or urinal) 3  -LF     Putting on and taking off regular upper body clothing 4  -LF     Taking care of personal grooming (such as brushing teeth) 4  -LF     Eating meals 4  -LF     AM-PAC 6 Clicks Score (OT) 21  -LF       Row Name 02/17/25 1100 02/17/25 1044       How much help from another person do you currently need...    Turning from your back to your side while in flat bed without using bedrails? 4  -DP 4  -KD    Moving from lying on back to sitting on the side of a flat bed without bedrails? 3  -DP 3  -KD     Moving to and from a bed to a chair (including a wheelchair)? 3  -DP 3  -KD    Standing up from a chair using your arms (e.g., wheelchair, bedside chair)? 3  -DP 3  -KD    Climbing 3-5 steps with a railing? 3  -DP 3  -KD    To walk in hospital room? 3  -DP 3  -KD    AM-PAC 6 Clicks Score (PT) 19  -DP 19  -KD    Highest Level of Mobility Goal 6 --> Walk 10 steps or more  -DP 6 --> Walk 10 steps or more  -KD      Row Name 02/17/25 1347 02/17/25 1100       Functional Assessment    Outcome Measure Options AM-PAC 6 Clicks Daily Activity (OT);Optimal Instrument  -LF AM-PAC 6 Clicks Basic Mobility (PT)  -DP      Row Name 02/17/25 1347          Optimal Instrument    Optimal Instrument Optimal - 3  -LF     Bending/Stooping 2  -LF     Standing 2  -LF     Reaching 1  -LF     From the list, choose the 3 activities you would most like to be able to do without any difficulty Bending/stooping;Standing;Reaching  -LF     Total Score Optimal - 3 5  -LF               User Key  (r) = Recorded By, (t) = Taken By, (c) = Cosigned By      Initials Name Provider Type    LF Melvina Chatterjee, OT Occupational Therapist    Grecia Ruiz, PT Physical Therapist    KD Kortney Stafford, RN Registered Nurse                    Occupational Therapy Education       Title: PT OT SLP Therapies (Done)       Topic: Occupational Therapy (Done)       Point: ADL training (Done)       Description:   Instruct learner(s) on proper safety adaptation and remediation techniques during self care or transfers.   Instruct in proper use of assistive devices.                  Learning Progress Summary            Patient Acceptance, E,TB, VU by  at 2/17/2025 1348                      Point: Precautions (Done)       Description:   Instruct learner(s) on prescribed precautions during self-care and functional transfers.                  Learning Progress Summary            Patient Acceptance, E,TB, VU by  at 2/17/2025 1348                      Point: Body mechanics  (Done)       Description:   Instruct learner(s) on proper positioning and spine alignment during self-care, functional mobility activities and/or exercises.                  Learning Progress Summary            Patient Acceptance, E,TB, VU by  at 2/17/2025 1348                                      User Key       Initials Effective Dates Name Provider Type Discipline     06/16/21 -  Melvina Chatterjee OT Occupational Therapist OT                  OT Recommendation and Plan  Planned Therapy Interventions (OT): activity tolerance training, BADL retraining, functional balance retraining, occupation/activity based interventions, patient/caregiver education/training, transfer/mobility retraining  Therapy Frequency (OT): 5 times/wk  Plan of Care Review  Plan of Care Reviewed With: patient, spouse  Progress: no change  Outcome Evaluation: Patient presents with limitations in self-care, functional transfers, and balance. He would benefit from continued skilled occupational therapy services to maximize independence with ADLs/functional transfers.     Time Calculation:   Evaluation Complexity (OT)  Review Occupational Profile/Medical/Therapy History Complexity: brief/low complexity  Assessment, Occupational Performance/Identification of Deficit Complexity: 1-3 performance deficits  Clinical Decision Making Complexity (OT): problem focused assessment/low complexity  Overall Complexity of Evaluation (OT): low complexity     Time Calculation- OT       Row Name 02/17/25 1349             Time Calculation- OT    OT Received On 02/17/25  -LF      OT Goal Re-Cert Due Date 02/26/25  -LF         Timed Charges    29218 - OT Therapeutic Activity Minutes 5  -LF      68434 - OT Self Care/Mgmt Minutes 10  -LF         Untimed Charges    OT Eval/Re-eval Minutes 32  -LF         Total Minutes    Timed Charges Total Minutes 15  -LF      Untimed Charges Total Minutes 32  -LF       Total Minutes 47  -LF                User Key  (r) = Recorded  By, (t) = Taken By, (c) = Cosigned By      Initials Name Provider Type     Melvina Chatterjee OT Occupational Therapist                  Therapy Charges for Today       Code Description Service Date Service Provider Modifiers Qty    27866798394 HC OT SELF CARE/MGMT/TRAIN EA 15 MIN 2/17/2025 Melvina Chatterjee OT GO 1    [id number]  OT EVAL LOW COMPLEXITY 3 2/17/2025 Melvina Chatterjee OT GO 1                 Melvina Chatterjee OT  2/17/2025

## 2025-02-17 NOTE — OP NOTE
TOTAL HIP ARTHROPLASTY ANTERIOR  Procedure Report    Patient Name:  Torri Mendoza  YOB: 1970    Date of Surgery:  2/17/2025     Indications:  Advanced hip arthritis, failed conservative care    Pre-op Diagnosis:   Osteoarthritis of right hip, unspecified osteoarthritis type [M16.11]       Post-Op Diagnosis Codes:     * Osteoarthritis of right hip, unspecified osteoarthritis type [M16.11]    Procedure/CPT® Codes:  [place] ARTHRP ACETBLR/PROX FEM PROSTC AGRFT/ALGRFT [84664]    Procedure(s):  RIGHT TOTAL HIP ARTHROPLASTY ANTERIOR    Staff:  Surgeon(s):  Dyan Ag MD    Assistant: Mari Mccloud; Susy Cisse    Surgical Approach: Hip Direct Anterior (Proctor-Palacio)        Anesthesia: General    Estimated Blood Loss: 100ml    Implants:    Implant Name Type Inv. Item Serial No.  Lot No. LRB No. Used Action   SCRW ACET LORI TRILOGY S/TAP 6.5X25 - [id number] Implant SCRW ACET LORI TRILOGY S/TAP 6.5X25  [employer] [id number] Right 1 Implanted   SHLL ACET OSSEOTI G7 4HL SZF 54MM - [id number] Implant SHLL ACET OSSEOTI G7 4HL SZF 54MM  [employer] [id number] Right 1 Implanted   LINER ACET G7 HI/WL E1 SZF 40MM - [id number] Implant LINER ACET G7 HI/WL E1 SZF 40MM  [employer] [id number] Right 1 Implanted   STEM FEM/HIP TAPERLOC COMPL MICROPLASTY HI/OFFST SZ15 - [id number] Implant STEM FEM/HIP TAPERLOC COMPL MICROPLASTY HI/OFFST SZ15  [employer] [id number] Right 1 Implanted   HD FEM/HIP G7 BIOLOX/DELTA OPTN 40MM - [id number] Implant HD FEM/HIP G7 BIOLOX/DELTA OPTN 40MM  [employer] [id number] Right 1 Implanted   ADAPT HIP BIOLOX OPTN TYPE1 TPR PLS3 - [id number] Implant ADAPT HIP BIOLOX OPTN TYPE1 TPR PLS3  [employer] [id number] Right 1 Implanted       Specimen:          None        Findings: Advanced arthritis    Complications:  None    Description of Procedure: The patient went to the operating room and  underwent anesthesia, received a preoperative antibiotic, was placed on the [place] table and was  then prepped and draped in standard fashion. A standard anterior hip incision was made. The interval was found and retractors were placed. The capsule was then opened. The femoral neck was identified. Retractors were placed around the neck. The femoral neck cut was then made and then the head was removed from the acetabulum. Acetabular retractors were then placed. The labrum was removed. C-arm fluoroscopy was used to help guide the reaming for the acetabulum. This was sequentially reamed and then the appropriate size shell was then inserted, followed by a screw and then the  liner.  Abundance of osteophytes around the acetabulum that had to be removed in order to assist with stability the bed was raised, the leg was dropped, and femoral exposure was obtained. This was then opened using a rat-tail reamer and then sequentially broached  and then a stem was inserted. Leg lengths were measured after reduction and a ceramic head was then chosen. This was then thoroughly irrigated, tranexamic acid and local were injected, and then closed using #1 Vicryl, 2-0 Vicryl and staples. Sterile dressing was applied. The patient was then taken to recovery in stable condition. There were no complications.    Assistant: Joanne Mccloud [place]  was responsible for performing the following activities: Retraction, Suction, Irrigation, Closing, and Placing Dressing and their skilled assistance was necessary for the success of this case.    Dyan Ag MD     Date: 2/17/2025  Time: 08:52 EST

## 2025-02-17 NOTE — THERAPY TREATMENT NOTE
Acute Care - Physical Therapy Treatment Note   Dev     Patient Name: Torri Mendoza  : 1970  MRN: [id number]  Today's Date: 2025      Visit Dx:     ICD-10-CM ICD-9-CM   1. Difficulty walking  R26.2 719.7   2. Osteoarthritis of right hip, unspecified osteoarthritis type  M16.11 715.95   3. Decreased activities of daily living (ADL)  Z78.9 V49.89     Patient Active Problem List   Diagnosis    Osteoarthritis of right hip    OA (osteoarthritis) of hip     Past Medical History:   Diagnosis Date    Hyperlipidemia     Lumbar radiculopathy     Lumbar spondylosis     Osteoarthritis     right hip     Past Surgical History:   Procedure Laterality Date    APPENDECTOMY      COLONOSCOPY      HERNIA REPAIR       PT Assessment (Last 12 Hours)       PT Evaluation and Treatment       Row Name 25 1455 25 1100       Physical Therapy Time and Intention    Subjective Information complains of;pain  -RH no complaints  -DP    Document Type therapy note (daily note)  -RH evaluation  -DP    Mode of Treatment individual therapy;group therapy;physical therapy  -RH individual therapy;physical therapy  -DP    Patient Effort good  -RH good  -DP    Comment Gait training performed individually; therapeutic exercises performed in a group setting with 2 participants  - --      Row Name 25 1455 25 1100       General Information    Patient Profile Reviewed -- yes  -DP    Patient Observations alert;cooperative;agree to therapy  -RH alert;cooperative;agree to therapy  -DP    Prior Level of Function -- independent:;gait;transfer;bed mobility;ADL's  -DP    Equipment Currently Used at Home -- none  -DP    Existing Precautions/Restrictions -- fall  -DP    Barriers to Rehab -- none identified  -DP      Row Name 25 1100          Living Environment    Current Living Arrangements home  -DP     Home Accessibility stairs to enter home  -DP     People in Home spouse  -DP       Row Name 25 1100          Home Main  Entrance    Number of Stairs, Main Entrance two  -DP       Row Name 02/17/25 1455          Pain    Pain Location hip  -RH     Pain Side/Orientation right  -RH       Row Name 02/17/25 1455          Pain Scale: FACES Pre/Post-Treatment    Pain: FACES Scale, Pretreatment 0-->no hurt  -RH     Posttreatment Pain Rating 2-->hurts little bit  -RH       Row Name 02/17/25 1100          Cognition    Orientation Status (Cognition) oriented x 3  -DP       [facility] Name 02/17/25 1455          Range of Motion (ROM)    Range of Motion --  Pt R hip AAROM at 90 degrees flex and 20 degrees abd.  -RH       Row Name 02/17/25 1100          Range of Motion Comprehensive    Comment, General Range of Motion BLE ROM:WFL  -DP       [facility] Name 02/17/25 1455          Strength (Manual Muscle Testing)    Strength (Manual Muscle Testing) --  Pt R hip flex strength at 3-/5.  -RH       Row Name 02/17/25 1100          Strength Comprehensive (MMT)    Comment, General Manual Muscle Testing (MMT) Assessment BLE: WFL except  -DP       [facility] Name 02/17/25 1455 02/17/25 1100       Mobility    Extremity Weight-bearing Status right lower extremity  -RH right lower extremity  -DP    Right Lower Extremity (Weight-bearing Status) weight-bearing as tolerated (WBAT)  -RH weight-bearing as tolerated (WBAT)  -DP      [facility] Name 02/17/25 1100          Bed Mobility    Bed Mobility supine-sit  -DP     Supine-Sit Sargeant (Bed Mobility) minimum assist (75% patient effort)  -[facility] Name 02/17/25 1455 02/17/25 1100       Transfers    Transfers sit-stand transfer;stand-sit transfer  - sit-stand transfer  -DP      Row Name 02/17/25 1455 02/17/25 1100       Sit-Stand Transfer    Sit-Stand Sargeant (Transfers) contact guard  - contact guard  -DP    Assistive Device (Sit-Stand Transfers) walker, front-wheeled  -RH --      Row Name 02/17/25 1455          Stand-Sit Transfer    Stand-Sit Sargeant (Transfers) contact guard  -     Assistive Device (Stand-Sit  Transfers) walker, front-wheeled  -RH       Row Name 02/17/25 1455 02/17/25 1100       Gait/Stairs (Locomotion)    Gait/Stairs Locomotion gait/ambulation assistive device  -RH gait/ambulation assistive device  -DP    [place] Level (Gait) contact guard  -RH contact guard  -DP    Assistive Device (Gait) walker, front-wheeled  -RH walker, front-wheeled  -DP    Patient was able to Ambulate yes  -RH yes  -DP    Distance in Feet (Gait) 175  -RH 20  -DP    Pattern (Gait) --  Pt able to achieve and maintain reciprocal gait.  -RH --    Deviations/Abnormal Patterns (Gait) antalgic;gait speed decreased;stride length decreased  -RH --    Bilateral Gait Deviations forward flexed posture;heel strike decreased  -RH --    Gait Assessment/Intervention Pt rushing advancement of LLE to minimize RLE weight-bearing.  -RH --    Negotiation (Stairs) stairs independence;stairs assistive device;handrail location;number of steps;ascending technique;descending technique  -RH --    [place]nott Level (Stairs) contact guard  -RH --    Handrail Location (Stairs) both sides  -RH --    Number of Steps (Stairs) 5 x 2  -RH --    Ascending Technique (Stairs) step-to-step  -RH --    Descending Technique (Stairs) step-to-step  -RH --      Row Name 02/17/25 1455          Safety Issues/Impairments Affecting Functional Mobility    Impairments Affecting Function (Mobility) balance;pain;range of motion (ROM);strength  -       Row Name 02/17/25 1455 02/17/25 1100       Balance    Balance Assessment standing dynamic balance  -RH standing dynamic balance  -DP    Dynamic Standing Balance contact guard  -RH contact guard  -DP    Position/Device Used, Standing Balance walker, front-wheeled  -RH supported;walker, front-wheeled  -DP      Row Name 02/17/25 1455          Motor Skills    Therapeutic Exercise knee;hip;ankle  -       Row Name 02/17/25 1455          Hip (Therapeutic Exercise)    Hip (Therapeutic Exercise) isometric exercises  -     Hip  Isometrics (Therapeutic Exercise) right;gluteal sets;aBduction;10 repetitions;2 sets  -       Row Name 02/17/25 1455          Knee (Therapeutic Exercise)    Knee (Therapeutic Exercise) isometric exercises;strengthening exercise  -     Knee Isometrics (Therapeutic Exercise) right;quad sets;10 repetitions;2 sets  -     Knee Strengthening (Therapeutic Exercise) right;heel slides;SAQ (short arc quad);LAQ (long arc quad);10 repetitions;2 sets  -       Row Name 02/17/25 1455          Ankle (Therapeutic Exercise)    Ankle (Therapeutic Exercise) AROM (active range of motion)  -     Ankle AROM (Therapeutic Exercise) right;dorsiflexion;plantarflexion;10 repetitions;2 sets  -       Row Name             Wound 02/17/25 0742 Right anterior hip    Wound - Properties Group Placement Date: 02/17/25  -SC Placement Time: 0742  -SC Side: Right  -SC Orientation: anterior  -SC Location: hip  -SC Primary Wound Type: Incision  -SC    Retired Wound - Properties Group Placement Date: 02/17/25  -SC Placement Time: 0742  -SC Side: Right  -SC Orientation: anterior  -SC Location: hip  -SC Primary Wound Type: Incision  -SC    Retired Wound - Properties Group Placement Date: 02/17/25  -SC Placement Time: 0742  -SC Side: Right  -SC Orientation: anterior  -SC Location: hip  -SC Primary Wound Type: Incision  -SC    Retired Wound - Properties Group Date first assessed: 02/17/25  -SC Time first assessed: 0742  -SC Side: Right  -SC Location: hip  -SC Primary Wound Type: Incision  -SC      Row Name 02/17/25 1100          Plan of Care Review    Plan of Care Reviewed With patient  -DP     Outcome Evaluation Pt had a joint replacement surgery today, and presents with decreased strength, ROM and ambulation. Will benefit from inpatient PT services and outpatient PT services upon DC. Recommend RW and BSC to use upon DC.  -DP       Row Name 02/17/25 1455          Vital Signs    O2 Delivery Intra Treatment room air  -RH       Row Name 02/17/25 1453  02/17/25 1100       Positioning and Restraints    Post Treatment Position -- chair  -DP    In Chair reclined;call light within reach;encouraged to call for assist;exit alarm on;with family/caregiver  -RH exit alarm on;encouraged to call for assist;call light within reach  -DP      Row Name 02/17/25 1100          Therapy Assessment/Plan (PT)    Criteria for Skilled Interventions Met (PT) yes;meets criteria  -DP     Therapy Frequency (PT) 2 times/day  -DP     Predicted Duration of Therapy Intervention (PT) 10 days  -DP     Problem List (PT) problems related to;balance;mobility;range of motion (ROM)  -DP     Activity Limitations Related to Problem List (PT) unable to transfer safely;unable to ambulate safely  -DP       Row Name 02/17/25 1100          PT Evaluation Complexity    History, PT Evaluation Complexity no personal factors and/or comorbidities  -DP     Examination of Body Systems (PT Eval Complexity) total of 4 or more elements  -DP     Clinical Presentation (PT Evaluation Complexity) stable  -DP     Clinical Decision Making (PT Evaluation Complexity) low complexity  -DP     Overall Complexity (PT Evaluation Complexity) low complexity  -DP       Row Name 02/17/25 5145          Progress Summary (PT)    Progress Toward Functional Goals (PT) progress toward functional goals is good  -     Daily Progress Summary (PT) Pt is progressing well with their exercise program.  Will continue current plan of care.  -RH       Row Name 02/17/25 1100          Physical Therapy Goals    Transfer Goal Selection (PT) transfer, PT goal 1  -DP     Gait Training Goal Selection (PT) gait training, PT goal 1  -DP       Row Name 02/17/25 1100          Transfer Goal 1 (PT)    Activity/Assistive Device (Transfer Goal 1, PT) sit-to-stand/stand-to-sit  -DP     [place] Level/Cues Needed (Transfer Goal 1, PT) supervision required  -DP     Time Frame (Transfer Goal 1, PT) 10 days  -DP       Row Name 02/17/25 1100          Gait Training  Goal 1 (PT)    Activity/Assistive Device (Gait Training Goal 1, PT) assistive device use;walker, rolling  -DP     [place] Level (Gait Training Goal 1, PT) supervision required  -DP     Distance (Gait Training Goal 1, PT) 300  -DP     Time Frame (Gait Training Goal 1, PT) 10 days  -DP               User Key  (r) = Recorded By, (t) = Taken By, (c) = Cosigned By      Initials Name Provider Type    Melita Vines RN Registered Nurse    Flip Cheung PTA Physical Therapist Assistant    Grecia Ruiz, PT Physical Therapist                    Physical Therapy Education        No education to display                  PT Recommendation and Plan     Progress Summary (PT)  Progress Toward Functional Goals (PT): progress toward functional goals is good  Daily Progress Summary (PT): Pt is progressing well with their exercise program.  Will continue current plan of care.   Outcome Measures       Row Name 02/17/25 1400 02/17/25 1100          How much help from another person do you currently need...    Turning from your back to your side while in flat bed without using bedrails? 4  -RH 4  -DP     Moving from lying on back to sitting on the side of a flat bed without bedrails? 3  -RH 3  -DP     Moving to and from a bed to a chair (including a wheelchair)? 3  -RH 3  -DP     Standing up from a chair using your arms (e.g., wheelchair, bedside chair)? 3  -RH 3  -DP     Climbing 3-5 steps with a railing? 4  -RH 3  -DP     To walk in hospital room? 4  -RH 3  -DP     AM-PAC 6 Clicks Score (PT) 21  -RH 19  -DP        Functional Assessment    Outcome Measure Options -- AM-PAC 6 Clicks Basic Mobility (PT)  -DP               User Key  (r) = Recorded By, (t) = Taken By, (c) = Cosigned By      Initials Name Provider Type    Flip Cheung PTA Physical Therapist Assistant    Grecia Ruiz, PT Physical Therapist                     Time Calculation:    PT Charges       Row Name 02/17/25 8284 02/17/25 8372           Time Calculation    PT Received On 02/17/25  -RH 02/17/25  -DP     PT Goal Re-Cert Due Date -- 02/26/25  -DP        Timed Charges    95896 - Gait Training Minutes  9  -RH --     09898 - PT Therapeutic Activity Minutes 5  -RH --        Untimed Charges    PT Eval/Re-eval Minutes -- 25  -DP     PT Group Therapy Minutes 20  -RH --        Total Minutes    Timed Charges Total Minutes 14  -RH --     Untimed Charges Total Minutes 20  -RH 25  -DP      Total Minutes 34  -RH 25  -DP               User Key  (r) = Recorded By, (t) = Taken By, (c) = Cosigned By      Initials Name Provider Type     Flip Chin PTA Physical Therapist Assistant    Grecia Ruiz, PT Physical Therapist                  Therapy Charges for Today       Code Description Service Date Service Provider Modifiers Qty    52223536707 HC GAIT TRAINING EA 15 MIN 2/17/2025 Flip Chin, XAVIER GP 1    62668040225 HC PT THER PROC GROUP 2/17/2025 Flip Chin PTA GP 1            PT G-Codes  Outcome Measure Options: AM-PAC 6 Clicks Daily Activity (OT), Optimal Instrument  AM-PAC 6 Clicks Score (PT): 21  AM-PAC 6 Clicks Score (OT): 21    Flip Chin PTA  2/17/2025

## 2025-02-17 NOTE — ANESTHESIA PREPROCEDURE EVALUATION
Anesthesia Evaluation     Patient summary reviewed and Nursing notes reviewed   no history of anesthetic complications:   NPO Solid Status: > 8 hours  NPO Liquid Status: > 2 hours           Airway   Mallampati: II  TM distance: >3 FB  Neck ROM: full  No difficulty expected  Dental      Pulmonary - negative pulmonary ROS and normal exam    breath sounds clear to auscultation  Cardiovascular - normal exam  Exercise tolerance: good (4-7 METS)    Rhythm: regular  Rate: normal    (+) hyperlipidemia      Neuro/Psych  (+) numbness  GI/Hepatic/Renal/Endo - negative ROS     Musculoskeletal     Abdominal    Substance History - negative use     OB/GYN negative ob/gyn ROS         Other   arthritis,     ROS/Med Hx Other: HX OF HLD. METS> ACTIVE. NO CP/SOA. ELM               Anesthesia Plan    ASA 2     ERAS Protocol     Reason for not using neuraxial anesthesia or peripheral nerve block: Patient Preference  (Patient understands anesthesia not responsible for dental damage.)  intravenous induction     Anesthetic plan, risks, benefits, and alternatives have been provided, discussed and informed consent has been obtained with: patient.  Pre-procedure education provided  Use of blood products discussed with patient .    Plan discussed with CRNA.    CODE STATUS:

## 2025-02-20 ENCOUNTER — TELEPHONE (OUTPATIENT)
Dept: ORTHOPEDIC SURGERY | Facility: CLINIC | Age: 55
End: 2025-02-20
Payer: COMMERCIAL

## 2025-02-20 DIAGNOSIS — R26.2 DIFFICULTY WALKING: ICD-10-CM

## 2025-02-20 DIAGNOSIS — Z96.641 AFTERCARE FOLLOWING RIGHT HIP JOINT REPLACEMENT SURGERY: Primary | ICD-10-CM

## 2025-02-20 DIAGNOSIS — Z47.1 AFTERCARE FOLLOWING RIGHT HIP JOINT REPLACEMENT SURGERY: Primary | ICD-10-CM

## 2025-02-20 NOTE — TELEPHONE ENCOUNTER
PATIENT REQUESTING REFILL ON PAIN MEDICATION. PATIENT IS TAKING 2 TABLETS EVERY 5 HOURS. SAVE CONCHISE NEMESIO MERCADO

## 2025-02-21 RX ORDER — HYDROCODONE BITARTRATE AND ACETAMINOPHEN 7.5; 325 MG/1; MG/1
1 TABLET ORAL EVERY 4 HOURS PRN
Qty: 42 TABLET | Refills: 0 | Status: SHIPPED | OUTPATIENT
Start: 2025-02-21

## 2025-02-21 NOTE — TELEPHONE ENCOUNTER
I spoke with  and he says that patient should try to go down to one at a time.  I spoke with patient and patient stated that he has started to go to one every 5 hours as needed.  I explained to him that one every 5 hours is okay and if he gets into a severe pain scenario it would be okay to take two just not every dosage.  Patient voiced understanding.

## 2025-03-04 ENCOUNTER — OFFICE VISIT (OUTPATIENT)
Dept: ORTHOPEDIC SURGERY | Facility: CLINIC | Age: 55
End: 2025-03-04
Payer: COMMERCIAL

## 2025-03-04 VITALS — HEART RATE: 67 BPM | OXYGEN SATURATION: 98 % | DIASTOLIC BLOOD PRESSURE: 78 MMHG | SYSTOLIC BLOOD PRESSURE: 156 MMHG

## 2025-03-04 DIAGNOSIS — Z96.641 S/P TOTAL RIGHT HIP ARTHROPLASTY: Primary | ICD-10-CM

## 2025-03-04 DIAGNOSIS — M25.551 RIGHT HIP PAIN: ICD-10-CM

## 2025-03-04 PROCEDURE — 99024 POSTOP FOLLOW-UP VISIT: CPT | Performed by: PHYSICIAN ASSISTANT

## 2025-03-04 RX ORDER — ASPIRIN 325 MG
325 TABLET ORAL DAILY
COMMUNITY

## 2025-03-04 RX ORDER — HYDROCODONE BITARTRATE AND ACETAMINOPHEN 7.5; 325 MG/1; MG/1
1 TABLET ORAL EVERY 4 HOURS PRN
Qty: 42 TABLET | Refills: 0 | Status: SHIPPED | OUTPATIENT
Start: 2025-03-04

## 2025-03-04 NOTE — PROGRESS NOTES
Chief Complaint  Follow-up of the Right Hip    Subjective          History of Present Illness     Torri Mendoza is a 54 y.o. male presents today for a follow-up of right hip.  Patient is 2 weeks postop right total hip arthroplasty performed by Dr. Ag on 2/17/2025 for OA.  Patient presents today with use of a cane.  He states his pain is a 5/10 today.  It has slowly started to improve.  Patient is taken the pain medication and request refill today.  He reports numbness of the incision and sharp shooting pains that go down the anterior aspect of his thigh occasionally.  He is going to physical therapy at [facility] in [place].     Patient denies fever or chills.  Denies redness, drainage, or complications from incision site.    No Known Allergies     Social History     Socioeconomic History    Marital status:    Tobacco Use    Smoking status: Former     Types: Cigarettes    Tobacco comments:     REPORTS HAS SWITCHED FROM CIGARETTES TO VAPE AND REPORTS IS ATTEMPTING TO WEAN HIM SELF OFF COMPLETELY. Vaped  Last 2/16/25    Vaping Use    Vaping status: Every Day    Substances: Nicotine, Flavoring, last 2/16/25    Devices: Disposable   Substance and Sexual Activity    Alcohol use: Yes     Comment: OCC    Drug use: Never    Sexual activity: Defer        I reviewed the patient's chief complaint, history of present illness, review of systems, past medical history, surgical history, family history, social history, medications, and allergy list.     REVIEW OF SYSTEMS    Constitutional: Denies fevers, chills, weight loss  Cardiovascular: Denies chest pain, shortness of breath  Skin: Denies rashes, acute skin changes  Neurologic: Denies headache, loss of consciousness  MSK: Right hip pain      Objective   Vital Signs:   /78   Pulse 67   SpO2 98%     There is no height or weight on file to calculate BMI.    Physical Exam    Tobacco Use: Medium Risk (3/4/2025)    Patient History     Smoking Tobacco Use: Former      Smokeless Tobacco Use: Unknown     Passive Exposure: Not on file     Patient reports they have a history of tobacco use; encouraged continued tobacco cessation for further health benefits.     General: Alert, no acute distress  Gait: Mildly antalgic with use of cane  Right lower extremity: Staples removed today without complications.  Anterior hip incision is well approximated and healing appropriately.  No redness or active drainage noted.  No concerning signs of infection.  Moderate swelling. No pain with passive hip ROM.  Knee extensor mechanism intact.  Intact hip abduction.  Hip flexion intact with mildly limited ROM in flexion.  Leg lengths appear symmetric.  Calf soft, nontender.  Demonstrates active ankle plantarflexion dorsiflexion.  Sensation intact over the dorsal and plantar foot.  Palpable pedal pulses.        Imaging Results (Most Recent)       Procedure Component Value Units Date/Time    XR Hip With or Without Pelvis 2 - 3 View Right [[id number]] Resulted: 03/04/25 1026     Updated: 03/04/25 1026    Narrative:      X-Ray Report:  Study: X-rays ordered, taken in the office, and reviewed today.   Site: Right Hip Xray  Indication: Right total hip arthroplasty follow-up  View: AP, frog lateral right hip  Findings: Intact right total hip arthroplasty.  No evidence of hardware   complications or loosening.  No periprosthetic fractures are visualized.    Hip joint is reduced.  Prior studies available for comparison: yes                         Assessment and Plan    Diagnoses and all orders for this visit:    1. S/P total right hip arthroplasty (Primary)  -     XR Hip With or Without Pelvis 2 - 3 View Right    2. Right hip pain            Follow Up   No follow-ups on file.  Patient Instructions   X-rays reviewed, showing intact hardware.   Sutures/staples removed in office, steri-strips applied. Allow steri-strips to fall off on their own in 7-10 days.  Patient may shower, but avoid submersion in water until  incision is fully healed. Do not apply any lotions, creams, or ointments to the incision at this time    Continue PT and use of cane until recommended by PT. We discussed the importance of home exercises in addition to PT.   Continue icing of the hip for management of swelling for approximately 15-20 minutes, three times daily, and as needed.     Patient will continue aspirin for DVT prophylaxis until prescription completed.   We discussed the importance of weaning from narcotic medications.     Follow-up in 4 weeks. Repeat imaging not needed at this visit.     Call or return if symptoms worsen or patient has any concerns.    Patient was given instructions and counseling regarding his condition or for health maintenance advice. Please see specific information pulled into the AVS if appropriate.       Beronica Matamoros PA-C  03/04/25  12:15 EST

## 2025-03-04 NOTE — PATIENT INSTRUCTIONS
X-rays reviewed, showing intact hardware.   Sutures/staples removed in office, steri-strips applied. Allow steri-strips to fall off on their own in 7-10 days.  Patient may shower, but avoid submersion in water until incision is fully healed. Do not apply any lotions, creams, or ointments to the incision at this time    Continue PT and use of cane until recommended by PT. We discussed the importance of home exercises in addition to PT.   Continue icing of the hip for management of swelling for approximately 15-20 minutes, three times daily, and as needed.     Patient will continue aspirin for DVT prophylaxis until prescription completed.   We discussed the importance of weaning from narcotic medications.     Follow-up in 4 weeks. Repeat imaging not needed at this visit.     Call or return if symptoms worsen or patient has any concerns.

## 2025-03-14 ENCOUNTER — TELEPHONE (OUTPATIENT)
Dept: ORTHOPEDIC SURGERY | Facility: CLINIC | Age: 55
End: 2025-03-14
Payer: COMMERCIAL

## 2025-03-14 DIAGNOSIS — Z96.641 S/P TOTAL RIGHT HIP ARTHROPLASTY: ICD-10-CM

## 2025-03-14 RX ORDER — HYDROCODONE BITARTRATE AND ACETAMINOPHEN 7.5; 325 MG/1; MG/1
1 TABLET ORAL EVERY 4 HOURS PRN
Qty: 42 TABLET | Refills: 0 | Status: SHIPPED | OUTPATIENT
Start: 2025-03-14

## 2025-03-24 ENCOUNTER — TELEPHONE (OUTPATIENT)
Dept: ORTHOPEDIC SURGERY | Facility: CLINIC | Age: 55
End: 2025-03-24
Payer: COMMERCIAL

## 2025-03-24 DIAGNOSIS — Z96.641 S/P TOTAL RIGHT HIP ARTHROPLASTY: ICD-10-CM

## 2025-03-24 RX ORDER — HYDROCODONE BITARTRATE AND ACETAMINOPHEN 7.5; 325 MG/1; MG/1
1 TABLET ORAL EVERY 4 HOURS PRN
Qty: 42 TABLET | Refills: 0 | Status: SHIPPED | OUTPATIENT
Start: 2025-03-24

## 2025-03-31 NOTE — PATIENT INSTRUCTIONS
Patient is doing very well. Advised to continue PT to completion to progress strength and ROM. Advised to continue home exercises outside of/in addition to PT. PT reordered today.    Continue icing hip as needed up to 4 times daily for no longer than 15-20 mins at a time.     Follow-up in 6 weeks. We will obtain x-rays of the hip at that time. Call with changes or concerns.

## 2025-03-31 NOTE — PROGRESS NOTES
Chief Complaint  Follow-up of the Right Hip    Subjective          History of Present Illness     Torri Mendoza is a 54 y.o. male presents today for a follow-up of right hip.  Patient is 6 weeks postop right total hip arthroplasty performed by Dr. Ag on 2/17/2025 for OA.  Last visit reported numbness of the incision and sharp shooting pains that go down the anterior aspect of his thigh occasionally.  He is going to physical therapy at [facility] in [place].     Patient presents today without use of assistive devices.  He is going to physical therapy at [facility] and is planning to transition down to once a week there.  He reports some tightness in the groin with hip flexion.  He also states that after walking long periods of time like around [place] and then sitting down to drive home in the car, when he goes to stand it is very stiff.  He is trying to get off of the pain medication because he wants to return to work.  Inquires what he can take for this.  He has not taken the pain medication often.  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  Typically he does assist with the job and heavy lifting but is able to return to work light duty and assist with management of the job site only primarily.      Patient denies fever or chills.  Denies redness, drainage, or complications from incision site.     No Known Allergies     Social History     Socioeconomic History    Marital status:    Tobacco Use    Smoking status: Former     Types: Cigarettes    Tobacco comments:     REPORTS HAS SWITCHED FROM CIGARETTES TO VAPE AND REPORTS IS ATTEMPTING TO WEAN HIM SELF OFF COMPLETELY. Vaped  Last 2/16/25    Vaping Use    Vaping status: Every Day    Substances: Nicotine, Flavoring, last 2/16/25    Devices: Disposable   Substance and Sexual Activity    Alcohol use: Yes     Comment: OCC    Drug use: Never    Sexual activity: Defer        I reviewed the patient's chief complaint, history of present illness, review of systems,  past medical history, surgical history, family history, social history, medications, and allergy list.     REVIEW OF SYSTEMS    Constitutional: Denies fevers, chills, weight loss  Cardiovascular: Denies chest pain, shortness of breath  Skin: Denies rashes, acute skin changes  Neurologic: Denies headache, loss of consciousness  MSK: Right hip pain      Objective   Vital Signs:   /89   Pulse 80   SpO2 95%     There is no height or weight on file to calculate BMI.    Physical Exam    Tobacco Use: Medium Risk (4/1/2025)    Patient History     Smoking Tobacco Use: Former     Smokeless Tobacco Use: Unknown     Passive Exposure: Not on file     Patient reports they have a history of tobacco use; encouraged continued tobacco cessation for further health benefits.     General: Alert, no acute distress  Gait: Nonantalgic without use of assistive devices  Right lower extremity: Well-healing surgical incision.  No concerning signs of infection.  Trace to none swelling. No pain with passive hip ROM.  Knee extensor mechanism intact.  Intact hip abduction.  Hip flexion intact.  Range of motion near full compared to the opposite side.  Leg lengths appear symmetric.  Calf soft, nontender.  Demonstrates active ankle plantarflexion dorsiflexion.  Sensation intact over the dorsal and plantar foot.  Palpable pedal pulses.          Assessment and Plan    Diagnoses and all orders for this visit:    1. S/P total right hip arthroplasty (Primary)  -     Ambulatory Referral to Physical Therapy for Evaluation & Treatment    2. Right hip pain  -     Ambulatory Referral to Physical Therapy for Evaluation & Treatment    Other orders  -     diclofenac (VOLTAREN) 50 MG EC tablet; Take 1 tablet by mouth 2 (Two) Times a Day.  Dispense: 60 tablet; Refill: 2        Discussed NSAID use, precautions, and recommendations on taking. Prescription for Diclofenac sent today. Advised not to take with additional NSAIDs (ibuprofen, aleve, naproxen, etc)  and to take with food. Patient voiced no known CKD, anti-platelet or anticoagulant use, or GI bleed/ulcer history. If planning to take NSAID long-term, would consider daily Famotidine/Pepcid for GI prophylaxis - you may discuss this with your primary care provider.     Work note given to return with primarily duty with no ladders or kneeling.  He will start mimicking his job responsibilities with therapy.      Follow Up   Return in about 6 weeks (around 5/13/2025).  Patient Instructions   Patient is doing very well. Advised to continue PT to completion to progress strength and ROM. Advised to continue home exercises outside of/in addition to PT. PT reordered today.    Continue icing hip as needed up to 4 times daily for no longer than 15-20 mins at a time.     Follow-up in 6 weeks. We will obtain x-rays of the hip at that time. Call with changes or concerns.     Patient was given instructions and counseling regarding his condition or for health maintenance advice. Please see specific information pulled into the AVS if appropriate.       Beronica Matamoros PA-C  04/01/25  13:13 EDT

## 2025-04-01 ENCOUNTER — OFFICE VISIT (OUTPATIENT)
Dept: ORTHOPEDIC SURGERY | Facility: CLINIC | Age: 55
End: 2025-04-01
Payer: COMMERCIAL

## 2025-04-01 VITALS — SYSTOLIC BLOOD PRESSURE: 139 MMHG | HEART RATE: 80 BPM | OXYGEN SATURATION: 95 % | DIASTOLIC BLOOD PRESSURE: 89 MMHG

## 2025-04-01 DIAGNOSIS — M25.551 RIGHT HIP PAIN: ICD-10-CM

## 2025-04-01 DIAGNOSIS — Z96.641 S/P TOTAL RIGHT HIP ARTHROPLASTY: Primary | ICD-10-CM

## 2025-04-01 PROCEDURE — 99024 POSTOP FOLLOW-UP VISIT: CPT | Performed by: PHYSICIAN ASSISTANT

## 2025-04-11 DIAGNOSIS — Z96.641 S/P TOTAL RIGHT HIP ARTHROPLASTY: ICD-10-CM

## 2025-04-11 RX ORDER — HYDROCODONE BITARTRATE AND ACETAMINOPHEN 7.5; 325 MG/1; MG/1
1 TABLET ORAL EVERY 4 HOURS PRN
Qty: 42 TABLET | Refills: 0 | Status: SHIPPED | OUTPATIENT
Start: 2025-04-11

## 2025-05-13 ENCOUNTER — OFFICE VISIT (OUTPATIENT)
Dept: ORTHOPEDIC SURGERY | Facility: CLINIC | Age: 55
End: 2025-05-13
Payer: COMMERCIAL

## 2025-05-13 VITALS
DIASTOLIC BLOOD PRESSURE: 99 MMHG | WEIGHT: 183 LBS | BODY MASS INDEX: 28.72 KG/M2 | HEART RATE: 79 BPM | OXYGEN SATURATION: 94 % | SYSTOLIC BLOOD PRESSURE: 173 MMHG | HEIGHT: 67 IN

## 2025-05-13 DIAGNOSIS — Z96.641 S/P TOTAL RIGHT HIP ARTHROPLASTY: ICD-10-CM

## 2025-05-13 DIAGNOSIS — M25.551 RIGHT HIP PAIN: Primary | ICD-10-CM

## 2025-05-13 PROCEDURE — 99024 POSTOP FOLLOW-UP VISIT: CPT | Performed by: PHYSICIAN ASSISTANT

## 2025-05-13 RX ORDER — AMOXICILLIN 500 MG/1
500 CAPSULE ORAL ONCE
Qty: 4 CAPSULE | Refills: 0 | Status: SHIPPED | OUTPATIENT
Start: 2025-05-13 | End: 2025-05-13

## 2025-05-13 NOTE — PROGRESS NOTES
Chief Complaint  Follow-up of the Right Hip    Subjective          History of Present Illness       History of Present Illness  The patient is a 55-year-old male here to follow up on his right hip. He is approximately 3 months post-right total hip arthroplasty, performed by Dr. Ag on 02/17/2024, for osteoarthritis.    He has been attending physical therapy at [facility] in [place], with the last visit planned to transition to once a week.  Visit he reported persistent tightness with hip flexion and stiffness after periods of activity followed by prolonged sitting.  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    Patient presents today and reports that  he is now capable of resuming ladder work and other tasks. He occasionally experiences groin pain, particularly after extensive walking and overall is much improved. He has noticed weight gain since the surgery, which he attributes to decreased activity. This is his first experience with a prosthetic joint. At the last visit, he was prescribed diclofenac as he was trying to discontinue pain medication.    PAST SURGICAL HISTORY:  Right total hip arthroplasty on 02/17/2024 for osteoarthritis.    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      No Known Allergies     Social History     Socioeconomic History    Marital status:    Tobacco Use    Smoking status: Former     Types: Cigarettes    Smokeless tobacco: Former    Tobacco comments:     REPORTS HAS SWITCHED FROM CIGARETTES TO VAPE AND REPORTS IS ATTEMPTING TO WEAN HIM SELF OFF COMPLETELY. Vaped  Last 2/16/25    Vaping Use    Vaping status: Every Day    Substances: Nicotine, Flavoring, last 2/16/25    Devices: Disposable   Substance and Sexual Activity    Alcohol use: Yes     Comment: OCC    Drug use: Never    Sexual activity: Defer        I reviewed the patient's chief  "complaint, history of present illness, review of systems, past medical history, surgical history, family history, social history, medications, and allergy list.     REVIEW OF SYSTEMS    Constitutional: Denies fevers, chills, weight loss  Cardiovascular: Denies chest pain, shortness of breath  Skin: Denies rashes, acute skin changes  Neurologic: Denies headache, loss of consciousness  MSK: Right hip pain      Objective   Vital Signs:   /99   Pulse 79   Ht 170.2 cm (67\")   Wt 83 kg (183 lb)   SpO2 94%   BMI 28.66 kg/m²     Body mass index is 28.66 kg/m².    Physical Exam    Tobacco Use: Medium Risk (5/13/2025)    Patient History     Smoking Tobacco Use: Former     Smokeless Tobacco Use: Former     Passive Exposure: Not on file     Patient reports they have a history of tobacco use; encouraged continued tobacco cessation for further health benefits.     General: Alert, no acute distress  Gait: Nonantalgic without use of AD  Right lower extremity: Incision well-healed.  No concerning signs of infection.  No swelling. No pain with passive hip ROM.  Knee extensor mechanism intact.  Intact hip abduction.  Hip flexion intact.  Hip ROM full and demonstrates good strength 5/5 with hip flexion, extension, abduction, adduction.  Leg lengths appear symmetric.  Calf soft, nontender.  Demonstrates active ankle plantarflexion dorsiflexion.  Sensation intact over the dorsal and plantar foot.  Palpable pedal pulses.      Imaging Results (Most Recent)       Procedure Component Value Units Date/Time    XR Hip With or Without Pelvis 2 - 3 View Right [[id number]] Resulted: 05/13/25 1032     Updated: 05/13/25 1032    Narrative:      X-Ray Report:  Study: X-rays ordered, taken in the office, and reviewed today.   Site: Right Hip Xray  Indication: Right total hip arthroplasty follow-up  View: AP, frog lateral right hip  Findings: Intact right total hip arthroplasty.  No evidence of hardware   complications or loosening.  No " periprosthetic fractures are visualized.    Hip joint is reduced.  Prior studies available for comparison: yes                      Assessment and Plan    Diagnoses and all orders for this visit:    1. Right hip pain (Primary)  -     XR Hip With or Without Pelvis 2 - 3 View Right    2. S/P total right hip arthroplasty    Other orders  -     amoxicillin (AMOXIL) 500 MG capsule; Take 1 capsule by mouth 1 (One) Time for 1 dose. Take all 4 capsules one hour prior to teeth cleaning/dental procedure with food.  Dispense: 4 capsule; Refill: 0        Assessment & Plan  Prophylactic antibiotics for upcoming dental procedure/appointment sent through.          Follow Up   Return in about 9 months (around 2/13/2026).  Patient Instructions     X-rays taken and reviewed with patient today in office. Patient is doing well.    Encouraged to continue home exercises for ROM and strengthening.   May continue icing as needed for no more than 20 minutes at a time for comfort and inflammation.   Encouraged to continue avoiding outward pivoting and avoid deep squatting.     Incision well healed. May apply Vitamin E, cocoa butter, etc. over incision to aid in scar appearance. Educated that numbness over the incision can still remain at this point, and should continue to improve for up to 1 year postop. However, at the year rachel if still experiencing numbness it may not return.    Discussed the need for antibiotic prophylaxis with dental procedures following total joint replacement for life. Patient instructed to contact office if dental office is reluctant to prescribe antibiotics prior to procedure and we will prescribe them.  Antibiotics sent to pharmacy.     Follow-up in 9 months. We will repeat xrays of the right hip at that visit.     Call with questions or concerns.     Patient was given instructions and counseling regarding his condition or for health maintenance advice. Please see specific information pulled into the AVS if  appropriate.       Beronica Matamoros PA-C  05/16/25  12:57 EDT    Patient or patient representative verbalized consent for the use of Ambient Listening during the visit with  Beronica Matamoros PA-C for chart documentation. 5/16/2025  08:53 EDT

## 2025-05-13 NOTE — PATIENT INSTRUCTIONS
X-rays taken and reviewed with patient today in office. Patient is doing well.    Encouraged to continue home exercises for ROM and strengthening.   May continue icing as needed for no more than 20 minutes at a time for comfort and inflammation.   Encouraged to continue avoiding outward pivoting and avoid deep squatting.     Incision well healed. May apply Vitamin E, cocoa butter, etc. over incision to aid in scar appearance. Educated that numbness over the incision can still remain at this point, and should continue to improve for up to 1 year postop. However, at the year rachel if still experiencing numbness it may not return.    Discussed the need for antibiotic prophylaxis with dental procedures following total joint replacement for life. Patient instructed to contact office if dental office is reluctant to prescribe antibiotics prior to procedure and we will prescribe them.  Antibiotics sent to pharmacy.     Follow-up in 9 months. We will repeat xrays of the right hip at that visit.     Call with questions or concerns.

## (undated) DEVICE — GLOVE,SURG,SENSICARE SLT,LF,PF,7: Brand: MEDLINE

## (undated) DEVICE — LINER ACET G7 VIVACIT/E NTRL HXPE SZF 36MM
Type: IMPLANTABLE DEVICE | Site: HIP | Status: NON-FUNCTIONAL
Removed: 2025-02-17

## (undated) DEVICE — KT PT POSITION SUPINE HANA/PROFX TABL

## (undated) DEVICE — TOWEL,OR,DSP,ST,BLUE,STD,4/PK,20PK/CS: Brand: MEDLINE

## (undated) DEVICE — SYR LUERLOK 30CC

## (undated) DEVICE — STRYKER PERFORMANCE SERIES SAGITTAL BLADE: Brand: STRYKER PERFORMANCE SERIES

## (undated) DEVICE — BNDG COBAN S/ADHR WRP LF 4IN 5YD TN

## (undated) DEVICE — PENCL SMOKE/EVAC MEGADYNE TELESCP 10FT

## (undated) DEVICE — PEEL-AWAY TOGA, 2X LARGE: Brand: FLYTE

## (undated) DEVICE — DRP SURG U/DRP INVISISHIELD PA 48X52IN

## (undated) DEVICE — GLOVE,SURG,SENSICARE SLT,LF,PF,8.5: Brand: MEDLINE

## (undated) DEVICE — GLV SURG BIOGEL LTX PF 8 1/2

## (undated) DEVICE — PULLOVER TOGA, 2X LARGE: Brand: FLYTE, SURGICOOL

## (undated) DEVICE — GLOVE,SURG,SENSICARE SLT,LF,PF,6.5: Brand: MEDLINE

## (undated) DEVICE — PCH INST SURG INVISISHIELD 2PCKT

## (undated) DEVICE — ANTIBACTERIAL VIOLET BRAIDED (POLYGLACTIN 910), SYNTHETIC ABSORBABLE SURGICAL SUTURE: Brand: COATED VICRYL

## (undated) DEVICE — GAUZE,SPONGE,4"X4",16PLY,STRL,LF,10/TRAY: Brand: MEDLINE

## (undated) DEVICE — SCRW ACET CORT TRILOGY S/TAP 6.5X35
Type: IMPLANTABLE DEVICE | Site: HIP | Status: NON-FUNCTIONAL
Removed: 2025-02-17

## (undated) DEVICE — SOL NACL 0.9PCT 100ML SGL

## (undated) DEVICE — MAT FLR ABS W/BLU/LINER 56X72IN WHT

## (undated) DEVICE — STERILE POLYISOPRENE POWDER-FREE SURGICAL GLOVES WITH EMOLLIENT COATING: Brand: PROTEXIS

## (undated) DEVICE — BIPOLAR SEALER 23-112-1 AQM 6.0: Brand: AQUAMANTYS™

## (undated) DEVICE — THE STERILE LIGHT HANDLE COVER IS USED WITH STERIS SURGICAL LIGHTING AND VISUALIZATION SYSTEMS.

## (undated) DEVICE — TOTAL ANTERIOR HIP-LF: Brand: MEDLINE INDUSTRIES, INC.

## (undated) DEVICE — SUT VIC 2/0 CT1 36IN

## (undated) DEVICE — APPL CHLORAPREP HI/LITE 26ML ORNG

## (undated) DEVICE — GLV SURG SENSICARE PI PF LF 7 GRN STRL

## (undated) DEVICE — SLV SCD KN/LEN ADJ EXPRSS BLENDED MD 1P/U

## (undated) DEVICE — CVR LEG BOOTLEG F/R NOSKID 33IN

## (undated) DEVICE — PROXIMATE RH ROTATING HEAD SKIN STAPLERS (35 WIDE) CONTAINS 35 STAINLESS STEEL STAPLES: Brand: PROXIMATE